# Patient Record
Sex: FEMALE | Race: WHITE | NOT HISPANIC OR LATINO | Employment: FULL TIME | URBAN - METROPOLITAN AREA
[De-identification: names, ages, dates, MRNs, and addresses within clinical notes are randomized per-mention and may not be internally consistent; named-entity substitution may affect disease eponyms.]

---

## 2017-04-05 ENCOUNTER — HOSPITAL ENCOUNTER (EMERGENCY)
Facility: HOSPITAL | Age: 37
Discharge: HOME | End: 2017-04-05
Attending: EMERGENCY MEDICINE
Payer: MEDICAID

## 2017-04-05 VITALS
HEIGHT: 70 IN | WEIGHT: 230 LBS | OXYGEN SATURATION: 98 % | DIASTOLIC BLOOD PRESSURE: 72 MMHG | SYSTOLIC BLOOD PRESSURE: 140 MMHG | HEART RATE: 72 BPM | TEMPERATURE: 98 F | BODY MASS INDEX: 32.93 KG/M2 | RESPIRATION RATE: 18 BRPM
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
## 2017-04-05 DIAGNOSIS — M25.561 ACUTE PAIN OF RIGHT KNEE: ICD-10-CM

## 2017-04-05 DIAGNOSIS — M79.641 RIGHT HAND PAIN: ICD-10-CM

## 2017-04-05 PROCEDURE — 99283 EMERGENCY DEPT VISIT LOW MDM: CPT

## 2017-04-05 PROCEDURE — 99283 EMERGENCY DEPT VISIT LOW MDM: CPT | Mod: ,,, | Performed by: EMERGENCY MEDICINE

## 2017-04-05 PROCEDURE — 25000003 PHARM REV CODE 250: Performed by: EMERGENCY MEDICINE

## 2017-04-05 RX ORDER — NAPROXEN SODIUM 550 MG/1
550 TABLET ORAL 2 TIMES DAILY WITH MEALS
Qty: 20 TABLET | Refills: 0 | Status: SHIPPED | OUTPATIENT
Start: 2017-04-05 | End: 2017-08-10

## 2017-04-05 RX ORDER — ACETAMINOPHEN AND CODEINE PHOSPHATE 300; 30 MG/1; MG/1
1 TABLET ORAL EVERY 6 HOURS PRN
Qty: 8 TABLET | Refills: 0 | Status: SHIPPED | OUTPATIENT
Start: 2017-04-05 | End: 2017-04-15

## 2017-04-05 RX ORDER — ACETAMINOPHEN AND CODEINE PHOSPHATE 300; 30 MG/1; MG/1
1 TABLET ORAL
Status: COMPLETED | OUTPATIENT
Start: 2017-04-05 | End: 2017-04-05

## 2017-04-05 RX ADMIN — ACETAMINOPHEN AND CODEINE PHOSPHATE 1 TABLET: 300; 30 TABLET ORAL at 05:04

## 2017-04-05 NOTE — ED NOTES
Patient identifiers for Shady Lopez checked and correct.  LOC: Patient is awake, alert, and aware of environment with an appropriate affect. Patient is oriented x 3 and speaking appropriately.  APPEARANCE: Patient resting comfortably and in no acute distress. Patient is clean and well groomed, patient's clothing is properly fastened.  SKIN: The skin is warm and dry. Patient has normal skin turgor and moist mucus membrances. Skin is intact; no bruising or breakdown noted.  MUSKULOSKELETAL: Patient is moving all extremities well, no obvious deformities noted. Pulses intact. Swelling to right hand  RESPIRATORY: Airway is open and patent. Respirations are spontaneous and non-labored with normal effort and rate.  CARDIAC: Patient has a normal rate and rhythm. No peripheral edema noted. Capillary refill < 3 seconds.  ABDOMEN: No distention noted. Bowel sounds active in all 4 quadrants. Soft and non-tender upon palpation.  NEUROLOGICAL: PERRL. Facial expression is symmetrical. Hand grasps are equal bilaterally. Normal sensation in all extremities when touched with finger.  Allergies reported: Review of patient's allergies indicates:  No Known Allergies

## 2017-04-05 NOTE — ED PROVIDER NOTES
Encounter Date: 2017    SCRIBE #1 NOTE: I, Mark Schultz, am scribing for, and in the presence of,  Dr. Lerma. I have scribed the entire note.       History     Chief Complaint   Patient presents with    Fall     Patient reports falling yesterday. She has pain to right hand and right knee. Right hand is swollen     Review of patient's allergies indicates:  No Known Allergies  HPI Comments: Time seen by provider: 4:49 AM    This is a 36 y.o. female with active co-morbidities of HTN and migraines who presents to the ED for evaluation of right hand pain on the ulnar side and right knee pain secondary to falling onto her right side 2 days ago. She denies any head trauma or LOC. Patient endorses swelling of her hand and mild numbness of her digits. She denies any other complaints at this time.     The history is provided by the patient.     Past Medical History:   Diagnosis Date    Bronchitis     Hypertension     Migraine headache     Migraines      Past Surgical History:   Procedure Laterality Date     SECTION      TUBAL LIGATION       Family History   Problem Relation Age of Onset    Asthma Mother     Heart disease Father      Social History   Substance Use Topics    Smoking status: Former Smoker    Smokeless tobacco: None    Alcohol use No     Review of Systems   Constitutional: Negative for fever.   HENT: Negative for sore throat.    Respiratory: Negative for shortness of breath.    Cardiovascular: Negative for chest pain.   Gastrointestinal: Negative for nausea.   Genitourinary: Negative for dysuria.   Musculoskeletal: Negative for back pain and neck pain.        Right hand and knee pain.   Skin: Negative for rash.   Neurological: Negative for weakness.   Hematological: Does not bruise/bleed easily.       Physical Exam   Initial Vitals   BP Pulse Resp Temp SpO2   17 0001 17 0001 17 0001 17 0001 17 0001   139/78 70 18 98.3 °F (36.8 °C) 98 %     Physical  Exam    Nursing note and vitals reviewed.  Constitutional: She appears well-developed and well-nourished.   Neck:   No neck or back pain.   Musculoskeletal:   Mildly limited flexion secondary to pain and swelling of the right hand. Swelling in the dorsal aspect at the ulnar side at the 3rd-5th digits. Pain with supination. Negative Lachman's test. Joint laxity with various stresses. Point tenderness over the tibial tuberosity. No swelling or effusion. Right elbow and shoulder with normal ROM.    Neurological:   Mild sensory deficit to distal fingertips of the right hand.         ED Course   Procedures  Labs Reviewed - No data to display          Medical Decision Making:   History:   Old Medical Records: I decided to obtain old medical records.  Initial Assessment:   Patient presents status post fall with a right hand and knee injury. XR negative for fractures. Overall impression is hand contusion and knee contusion. Doubt sprain of the knee based on my exam. Recommend rest, ice, compression, and elevation.   Clinical Tests:   Radiological Study: Ordered and Reviewed            Scribe Attestation:   Scribe #1: I performed the above scribed service and the documentation accurately describes the services I performed. I attest to the accuracy of the note.    Attending Attestation:           Physician Attestation for Scribe:  Physician Attestation Statement for Scribe #1: I, Dr. Lerma, reviewed documentation, as scribed by Mark Schultz in my presence, and it is both accurate and complete.                 ED Course     Clinical Impression:   The primary encounter diagnosis was Fall, initial encounter. Diagnoses of Fall, Right hand pain, and Acute pain of right knee were also pertinent to this visit.    Disposition:   Disposition: Discharged  Condition: Stable       Carlton Lerma MD  04/16/17 1941

## 2017-04-05 NOTE — DISCHARGE INSTRUCTIONS
RICE     Rest an injury, elevate it, and use ice and compression as directed.   RICE stands for rest, ice, compression, and elevation. These can limit pain and swelling after an injury. RICE may be recommended to help treat fractures, sprains, strains, and bruises or bumps.   Home care  The following explain the details of RICE:  · Rest. Limit the use of the injured body part. This helps prevent further damage to the body part and gives it time to heal. In some cases, you may need a sling, brace, splint, or cast to help keep the body part still until it has healed.  · Ice. Applying ice right after an injury helps relieve pain and swelling. Wrap a bag of ice in a thin towel. Then, place it over the injured area. Do this for 10 to 15 minutes every 3 to 4 hours. Continue for the next 1 to 3 days or until your symptoms improve. Never put ice directly on your skin or ice an area longer than 15 minutes at a time.  · Compression. Putting pressure on an injury helps reduce swelling and provides support. Wrap the injured area firmly with an elastic bandage/wrap. Make sure not to wrap the bandage too tightly or you will cut off blood flow to the injured area. If your bandage loosens, rewrap it.  · Elevation. Keeping an injury raised above the level of your heart reduces swelling, pain, and throbbing. For instance, if you have a broken leg, it may help to rest your leg on several pillows when sitting or lying down. Try to keep the injured area elevated for at least 2 to 3 hours per day.  Follow-up care  Follow up with your healthcare provider, or as advised.  When to seek medical advice  Call your healthcare provider right away if any of these occur:  · Fever of 100.4°F (38°C) or higher, or as directed by your healthcare provider  · Increased pain or swelling in the injured body part  · Injured body part becomes cold, blue, numb, or tingly  · Signs of infection. These include warmth in the skin, redness, drainage, or bad  smell coming from the injured body part.  Date Last Reviewed: 1/18/2016  © 2590-7439 [employer]. [address] [place] [address], [place] [address]. All rights reserved. This information is not intended as a substitute for professional medical care. Always follow your healthcare professional's instructions.

## 2017-04-05 NOTE — ED AVS SNAPSHOT
[facility]  [address]  [facility] [address]               Shady Lopez   2017  3:14 AM   ED    Description:  Female : 1980   Department:  [facility]           Your Care was Coordinated By:     Provider Role From To    Yas Jimenez MD Resident 17      Reason for Visit     Fall           Diagnoses this Visit        Comments    Fall, initial encounter    -  Primary     Fall         Right hand pain         Acute pain of right knee           ED Disposition     None           To Do List           Follow-up Information     Follow up with Mc Minaya NP. Call in 3 days.    Specialty:  Family Medicine    Why:  As needed    Contact information:    [address] [place] [address]  [phone]         These Medications        Disp Refills Start End    naproxen sodium (ANAPROX) 550 MG tablet 20 tablet 0 2017     Take 1 tablet (550 mg total) by mouth 2 (two) times daily with meals. - Oral    Pharmacy: [facility] [address] [place], [place] [address] [address] EX[address] AT [facility] Ph #: [phone]       acetaminophen-codeine 300-30mg (TYLENOL #3) 300-30 mg Tab 8 tablet 0 2017 4/15/2017    Take 1 tablet by mouth every 6 (six) hours as needed (severe pain). - Oral    Pharmacy: [facility] [address] [place], [place] [address] [address] AT [facility] Ph #: [phone]         [facility]     [facility] Nurse Care Line -  Assistance  Unless otherwise directed by your provider, please contact [facility], our nurse care line that is available for  assistance.     Registered nurses in the [facility] provide: appointment scheduling, clinical advisement, health education, and other advisory services.  Call: [phone] (toll free)               Medications           Message regarding Medications     Verify the changes and/or additions to  "your medication regime listed below are the same as discussed with your clinician today.  If any of these changes or additions are incorrect, please notify your healthcare provider.        START taking these NEW medications        Refills    acetaminophen-codeine 300-30mg (TYLENOL #3) 300-30 mg Tab 0    Sig: Take 1 tablet by mouth every 6 (six) hours as needed (severe pain).    Class: Print    Route: Oral           Verify that the below list of medications is an accurate representation of the medications you are currently taking.  If none reported, the list may be blank. If incorrect, please contact your healthcare provider. Carry this list with you in case of emergency.           Current Medications     acetaminophen-codeine 300-30mg (TYLENOL #3) 300-30 mg Tab Take 1 tablet by mouth every 6 (six) hours as needed (severe pain).    butalbital-acetaminophen-caffeine -40 mg (FIORICET, ESGIC) -40 mg per tablet Take 1 tablet by mouth every 4 (four) hours as needed for Pain.    labetalol (NORMODYNE) 200 MG tablet Take 1 tablet (200 mg total) by mouth every 12 (twelve) hours.    meperidine (DEMEROL) 50 mg tablet Take 1 tablet (50 mg total) by mouth every 6 (six) hours as needed.    naproxen (NAPROSYN) 375 MG tablet Take 1 tablet (375 mg total) by mouth 2 (two) times daily as needed (take as needed for pain with meals).    naproxen sodium (ANAPROX) 550 MG tablet Take 1 tablet (550 mg total) by mouth 2 (two) times daily with meals.           Clinical Reference Information           Your Vitals Were     BP Pulse Temp Resp Height Weight    139/78 (BP Location: Right arm, Patient Position: Sitting) 70 98.3 °F (36.8 °C) (Oral) 18 5' 10" (1.778 m) 104.3 kg (230 lb)    SpO2 BMI             98% 33 kg/m2         Allergies as of 4/5/2017     No Known Allergies      Immunizations Administered on Date of Encounter - 4/5/2017     None      ED Micro, Lab, POCT     None      ED Imaging Orders     Start Ordered       Status " Ordering Provider    04/05/17 0234 04/05/17 0233  X-Ray Hand 3 view Right  1 time imaging      Final result     04/05/17 0234 04/05/17 0233  X-Ray Wrist Complete Right  1 time imaging      Final result     04/05/17 0234 04/05/17 0233  X-Ray Knee 3 View Right  1 time imaging      Final result         Discharge Instructions         RICE     Rest an injury, elevate it, and use ice and compression as directed.   RICE stands for rest, ice, compression, and elevation. These can limit pain and swelling after an injury. RICE may be recommended to help treat fractures, sprains, strains, and bruises or bumps.   Home care  The following explain the details of RICE:  · Rest. Limit the use of the injured body part. This helps prevent further damage to the body part and gives it time to heal. In some cases, you may need a sling, brace, splint, or cast to help keep the body part still until it has healed.  · Ice. Applying ice right after an injury helps relieve pain and swelling. Wrap a bag of ice in a thin towel. Then, place it over the injured area. Do this for 10 to 15 minutes every 3 to 4 hours. Continue for the next 1 to 3 days or until your symptoms improve. Never put ice directly on your skin or ice an area longer than 15 minutes at a time.  · Compression. Putting pressure on an injury helps reduce swelling and provides support. Wrap the injured area firmly with an elastic bandage/wrap. Make sure not to wrap the bandage too tightly or you will cut off blood flow to the injured area. If your bandage loosens, rewrap it.  · Elevation. Keeping an injury raised above the level of your heart reduces swelling, pain, and throbbing. For instance, if you have a broken leg, it may help to rest your leg on several pillows when sitting or lying down. Try to keep the injured area elevated for at least 2 to 3 hours per day.  Follow-up care  Follow up with your healthcare provider, or as advised.  When to seek medical advice  Call your  healthcare provider right away if any of these occur:  · Fever of 100.4°F (38°C) or higher, or as directed by your healthcare provider  · Increased pain or swelling in the injured body part  · Injured body part becomes cold, blue, numb, or tingly  · Signs of infection. These include warmth in the skin, redness, drainage, or bad smell coming from the injured body part.  Date Last Reviewed: 1/18/2016  © 1626-5560 [employer]. [address] [place] [address]. All rights reserved. This information is not intended as a substitute for professional medical care. Always follow your healthcare professional's instructions.          [facility] Sign-Up     Activating your [facility] account is as easy as 1-2-3!     1) Visit [email or web address], select Sign Up Now, enter this activation code and your date of birth, then select Next.  [email or web address]  Expires: 5/20/2017  4:56 AM      2) Create a username and password to use when you visit [facility] in the future and select a security question in case you lose your password and select Next.    3) Enter your e-mail address and click Sign Up!    Additional Information  If you have questions, please e-mail [email or web address] or call [phone] to talk to our [facility] staff. Remember, [facility] is NOT to be used for urgent needs. For medical emergencies, dial 911.         Smoking Cessation     If you would like to quit smoking:   You may be eligible for free services if you are a [place] resident and started smoking cigarettes before September 1, 1988.  Call the Smoking Cessation Trust (SCT) toll free at (654) 076-5526 or (010) 784-4536.   Call 6-800-QUIT-NOW if you do not meet the above criteria.   Contact us via email: [email or web address]   View our website for more information: [email or web address]         [facility] complies with applicable Federal civil rights laws and does not discriminate on the basis of race, color,  national origin, age, disability, or sex.        Language Assistance Services     ATTENTION: Language assistance services are available, free of charge. Please call [phone].      ATENCIÓN: Si habla sienna, tiene a lopez disposición servicios gratuitos de asistencia lingüística. Llame al [phone].     CHÚ Ý: N?u b?n nói Ti?ng Vi?t, có các d?ch v? h? tr? ngôn ng? mi?n phí dành cho b?n. G?i s? [phone].

## 2017-08-10 ENCOUNTER — ANESTHESIA (OUTPATIENT)
Dept: SURGERY | Facility: HOSPITAL | Age: 37
End: 2017-08-10
Payer: MEDICAID

## 2017-08-10 ENCOUNTER — HOSPITAL ENCOUNTER (OUTPATIENT)
Facility: HOSPITAL | Age: 37
Discharge: HOME | End: 2017-08-11
Attending: EMERGENCY MEDICINE | Admitting: EMERGENCY MEDICINE
Payer: MEDICAID

## 2017-08-10 ENCOUNTER — ANESTHESIA EVENT (OUTPATIENT)
Dept: SURGERY | Facility: HOSPITAL | Age: 37
End: 2017-08-10
Payer: MEDICAID

## 2017-08-10 DIAGNOSIS — K81.0 CHOLECYSTITIS, ACUTE: Primary | ICD-10-CM

## 2017-08-10 DIAGNOSIS — K80.00 CALCULUS OF GALLBLADDER WITH ACUTE CHOLECYSTITIS WITHOUT OBSTRUCTION: ICD-10-CM

## 2017-08-10 LAB
ALBUMIN SERPL BCP-MCNC: 3.9 G/DL
ALP SERPL-CCNC: 113 U/L
ALT SERPL W/O P-5'-P-CCNC: 13 U/L
ANION GAP SERPL CALC-SCNC: 9 MMOL/L
AST SERPL-CCNC: 12 U/L
B-HCG UR QL: NEGATIVE
BASOPHILS # BLD AUTO: 0.02 K/UL
BASOPHILS NFR BLD: 0.2 %
BILIRUB SERPL-MCNC: 0.6 MG/DL
BUN SERPL-MCNC: 15 MG/DL
CALCIUM SERPL-MCNC: 9.9 MG/DL
CHLORIDE SERPL-SCNC: 100 MMOL/L
CO2 SERPL-SCNC: 27 MMOL/L
CREAT SERPL-MCNC: 1 MG/DL
CTP QC/QA: YES
DIFFERENTIAL METHOD: ABNORMAL
EOSINOPHIL # BLD AUTO: 0.2 K/UL
EOSINOPHIL NFR BLD: 2.2 %
ERYTHROCYTE [DISTWIDTH] IN BLOOD BY AUTOMATED COUNT: 13.2 %
EST. GFR  (AFRICAN AMERICAN): >60 ML/MIN/1.73 M^2
EST. GFR  (NON AFRICAN AMERICAN): >60 ML/MIN/1.73 M^2
GLUCOSE SERPL-MCNC: 110 MG/DL
HCT VFR BLD AUTO: 39.2 %
HGB BLD-MCNC: 13.1 G/DL
LIPASE SERPL-CCNC: 22 U/L
LYMPHOCYTES # BLD AUTO: 1.4 K/UL
LYMPHOCYTES NFR BLD: 13.7 %
MCH RBC QN AUTO: 29 PG
MCHC RBC AUTO-ENTMCNC: 33.4 G/DL
MCV RBC AUTO: 87 FL
MONOCYTES # BLD AUTO: 0.5 K/UL
MONOCYTES NFR BLD: 4.6 %
NEUTROPHILS # BLD AUTO: 8.4 K/UL
NEUTROPHILS NFR BLD: 79.3 %
PLATELET # BLD AUTO: 319 K/UL
PMV BLD AUTO: 10.8 FL
POTASSIUM SERPL-SCNC: 4.5 MMOL/L
PROT SERPL-MCNC: 7.4 G/DL
RBC # BLD AUTO: 4.51 M/UL
SODIUM SERPL-SCNC: 136 MMOL/L
WBC # BLD AUTO: 10.52 K/UL

## 2017-08-10 PROCEDURE — 25000003 PHARM REV CODE 250: Performed by: NURSE PRACTITIONER

## 2017-08-10 PROCEDURE — 25000003 PHARM REV CODE 250: Performed by: NURSE ANESTHETIST, CERTIFIED REGISTERED

## 2017-08-10 PROCEDURE — G0378 HOSPITAL OBSERVATION PER HR: HCPCS

## 2017-08-10 PROCEDURE — 96365 THER/PROPH/DIAG IV INF INIT: CPT

## 2017-08-10 PROCEDURE — 99285 EMERGENCY DEPT VISIT HI MDM: CPT | Mod: 25

## 2017-08-10 PROCEDURE — 88304 TISSUE EXAM BY PATHOLOGIST: CPT | Mod: 26,,, | Performed by: PATHOLOGY

## 2017-08-10 PROCEDURE — 25000003 PHARM REV CODE 250: Performed by: SURGERY

## 2017-08-10 PROCEDURE — 36000708 HC OR TIME LEV III 1ST 15 MIN: Performed by: SURGERY

## 2017-08-10 PROCEDURE — 96375 TX/PRO/DX INJ NEW DRUG ADDON: CPT

## 2017-08-10 PROCEDURE — 63600175 PHARM REV CODE 636 W HCPCS: Performed by: NURSE PRACTITIONER

## 2017-08-10 PROCEDURE — D9220A PRA ANESTHESIA: Mod: ANES,,, | Performed by: ANESTHESIOLOGY

## 2017-08-10 PROCEDURE — 96361 HYDRATE IV INFUSION ADD-ON: CPT

## 2017-08-10 PROCEDURE — 88304 TISSUE EXAM BY PATHOLOGIST: CPT | Performed by: PATHOLOGY

## 2017-08-10 PROCEDURE — 71000033 HC RECOVERY, INTIAL HOUR: Performed by: SURGERY

## 2017-08-10 PROCEDURE — 25000003 PHARM REV CODE 250: Performed by: STUDENT IN AN ORGANIZED HEALTH CARE EDUCATION/TRAINING PROGRAM

## 2017-08-10 PROCEDURE — 37000008 HC ANESTHESIA 1ST 15 MINUTES: Performed by: SURGERY

## 2017-08-10 PROCEDURE — 37000009 HC ANESTHESIA EA ADD 15 MINS: Performed by: SURGERY

## 2017-08-10 PROCEDURE — 71000039 HC RECOVERY, EACH ADD'L HOUR: Performed by: SURGERY

## 2017-08-10 PROCEDURE — 85025 COMPLETE CBC W/AUTO DIFF WBC: CPT

## 2017-08-10 PROCEDURE — 27201423 OPTIME MED/SURG SUP & DEVICES STERILE SUPPLY: Performed by: SURGERY

## 2017-08-10 PROCEDURE — 80053 COMPREHEN METABOLIC PANEL: CPT

## 2017-08-10 PROCEDURE — 63600175 PHARM REV CODE 636 W HCPCS: Performed by: NURSE ANESTHETIST, CERTIFIED REGISTERED

## 2017-08-10 PROCEDURE — D9220A PRA ANESTHESIA: Mod: CRNA,,, | Performed by: NURSE ANESTHETIST, CERTIFIED REGISTERED

## 2017-08-10 PROCEDURE — 83690 ASSAY OF LIPASE: CPT

## 2017-08-10 PROCEDURE — 81025 URINE PREGNANCY TEST: CPT | Performed by: NURSE PRACTITIONER

## 2017-08-10 PROCEDURE — S0028 INJECTION, FAMOTIDINE, 20 MG: HCPCS | Performed by: STUDENT IN AN ORGANIZED HEALTH CARE EDUCATION/TRAINING PROGRAM

## 2017-08-10 PROCEDURE — 63600175 PHARM REV CODE 636 W HCPCS: Performed by: ANESTHESIOLOGY

## 2017-08-10 PROCEDURE — 36000709 HC OR TIME LEV III EA ADD 15 MIN: Performed by: SURGERY

## 2017-08-10 RX ORDER — ENOXAPARIN SODIUM 100 MG/ML
40 INJECTION SUBCUTANEOUS EVERY 24 HOURS
Status: DISCONTINUED | OUTPATIENT
Start: 2017-08-11 | End: 2017-08-11 | Stop reason: HOSPADM

## 2017-08-10 RX ORDER — NEOSTIGMINE METHYLSULFATE 1 MG/ML
INJECTION, SOLUTION INTRAVENOUS
Status: DISCONTINUED | OUTPATIENT
Start: 2017-08-10 | End: 2017-08-10

## 2017-08-10 RX ORDER — ROCURONIUM BROMIDE 10 MG/ML
INJECTION, SOLUTION INTRAVENOUS
Status: DISCONTINUED | OUTPATIENT
Start: 2017-08-10 | End: 2017-08-10

## 2017-08-10 RX ORDER — MIDAZOLAM HYDROCHLORIDE 1 MG/ML
INJECTION, SOLUTION INTRAMUSCULAR; INTRAVENOUS
Status: DISCONTINUED | OUTPATIENT
Start: 2017-08-10 | End: 2017-08-10

## 2017-08-10 RX ORDER — SODIUM CHLORIDE 0.9 % (FLUSH) 0.9 %
3 SYRINGE (ML) INJECTION
Status: DISCONTINUED | OUTPATIENT
Start: 2017-08-10 | End: 2017-08-11 | Stop reason: HOSPADM

## 2017-08-10 RX ORDER — METOCLOPRAMIDE HYDROCHLORIDE 5 MG/ML
10 INJECTION INTRAMUSCULAR; INTRAVENOUS EVERY 10 MIN PRN
Status: DISCONTINUED | OUTPATIENT
Start: 2017-08-10 | End: 2017-08-11 | Stop reason: HOSPADM

## 2017-08-10 RX ORDER — LIDOCAINE HYDROCHLORIDE AND EPINEPHRINE 10; 10 MG/ML; UG/ML
INJECTION, SOLUTION INFILTRATION; PERINEURAL
Status: DISCONTINUED | OUTPATIENT
Start: 2017-08-10 | End: 2017-08-10 | Stop reason: HOSPADM

## 2017-08-10 RX ORDER — OXYCODONE AND ACETAMINOPHEN 5; 325 MG/1; MG/1
1 TABLET ORAL EVERY 4 HOURS PRN
Status: DISCONTINUED | OUTPATIENT
Start: 2017-08-10 | End: 2017-08-11 | Stop reason: HOSPADM

## 2017-08-10 RX ORDER — ACETAMINOPHEN 10 MG/ML
INJECTION, SOLUTION INTRAVENOUS
Status: DISCONTINUED | OUTPATIENT
Start: 2017-08-10 | End: 2017-08-10

## 2017-08-10 RX ORDER — ONDANSETRON 2 MG/ML
4 INJECTION INTRAMUSCULAR; INTRAVENOUS EVERY 12 HOURS PRN
Status: DISCONTINUED | OUTPATIENT
Start: 2017-08-10 | End: 2017-08-10

## 2017-08-10 RX ORDER — GLYCOPYRROLATE 0.2 MG/ML
INJECTION INTRAMUSCULAR; INTRAVENOUS
Status: DISCONTINUED | OUTPATIENT
Start: 2017-08-10 | End: 2017-08-10

## 2017-08-10 RX ORDER — ONDANSETRON 2 MG/ML
INJECTION INTRAMUSCULAR; INTRAVENOUS
Status: DISCONTINUED | OUTPATIENT
Start: 2017-08-10 | End: 2017-08-10

## 2017-08-10 RX ORDER — LABETALOL 100 MG/1
200 TABLET, FILM COATED ORAL EVERY 12 HOURS
Status: DISCONTINUED | OUTPATIENT
Start: 2017-08-10 | End: 2017-08-11 | Stop reason: HOSPADM

## 2017-08-10 RX ORDER — SODIUM CHLORIDE 9 MG/ML
INJECTION, SOLUTION INTRAVENOUS CONTINUOUS
Status: DISCONTINUED | OUTPATIENT
Start: 2017-08-10 | End: 2017-08-11 | Stop reason: HOSPADM

## 2017-08-10 RX ORDER — ONDANSETRON 2 MG/ML
4 INJECTION INTRAMUSCULAR; INTRAVENOUS EVERY 6 HOURS PRN
Status: DISCONTINUED | OUTPATIENT
Start: 2017-08-10 | End: 2017-08-11 | Stop reason: HOSPADM

## 2017-08-10 RX ORDER — MORPHINE SULFATE 10 MG/ML
2 INJECTION INTRAMUSCULAR; INTRAVENOUS; SUBCUTANEOUS EVERY 4 HOURS PRN
Status: DISCONTINUED | OUTPATIENT
Start: 2017-08-10 | End: 2017-08-10

## 2017-08-10 RX ORDER — MEPERIDINE HYDROCHLORIDE 50 MG/ML
12.5 INJECTION INTRAMUSCULAR; INTRAVENOUS; SUBCUTANEOUS ONCE AS NEEDED
Status: ACTIVE | OUTPATIENT
Start: 2017-08-10 | End: 2017-08-10

## 2017-08-10 RX ORDER — FENTANYL CITRATE 50 UG/ML
INJECTION, SOLUTION INTRAMUSCULAR; INTRAVENOUS
Status: DISCONTINUED | OUTPATIENT
Start: 2017-08-10 | End: 2017-08-10

## 2017-08-10 RX ORDER — HYDROMORPHONE HYDROCHLORIDE 2 MG/ML
0.2 INJECTION, SOLUTION INTRAMUSCULAR; INTRAVENOUS; SUBCUTANEOUS EVERY 5 MIN PRN
Status: DISCONTINUED | OUTPATIENT
Start: 2017-08-10 | End: 2017-08-11 | Stop reason: HOSPADM

## 2017-08-10 RX ORDER — HYDROMORPHONE HYDROCHLORIDE 2 MG/ML
1 INJECTION, SOLUTION INTRAMUSCULAR; INTRAVENOUS; SUBCUTANEOUS EVERY 4 HOURS PRN
Status: DISCONTINUED | OUTPATIENT
Start: 2017-08-10 | End: 2017-08-10

## 2017-08-10 RX ORDER — BUPIVACAINE HYDROCHLORIDE 5 MG/ML
INJECTION, SOLUTION PERINEURAL
Status: DISCONTINUED | OUTPATIENT
Start: 2017-08-10 | End: 2017-08-10 | Stop reason: HOSPADM

## 2017-08-10 RX ORDER — ENOXAPARIN SODIUM 100 MG/ML
40 INJECTION SUBCUTANEOUS EVERY 24 HOURS
Status: DISCONTINUED | OUTPATIENT
Start: 2017-08-10 | End: 2017-08-10

## 2017-08-10 RX ORDER — LIDOCAINE HCL/PF 100 MG/5ML
SYRINGE (ML) INTRAVENOUS
Status: DISCONTINUED | OUTPATIENT
Start: 2017-08-10 | End: 2017-08-10

## 2017-08-10 RX ORDER — HYDRALAZINE HYDROCHLORIDE 20 MG/ML
INJECTION INTRAMUSCULAR; INTRAVENOUS
Status: DISCONTINUED | OUTPATIENT
Start: 2017-08-10 | End: 2017-08-10

## 2017-08-10 RX ORDER — PROPOFOL 10 MG/ML
VIAL (ML) INTRAVENOUS
Status: DISCONTINUED | OUTPATIENT
Start: 2017-08-10 | End: 2017-08-10

## 2017-08-10 RX ORDER — KETOROLAC TROMETHAMINE 30 MG/ML
INJECTION, SOLUTION INTRAMUSCULAR; INTRAVENOUS
Status: DISCONTINUED | OUTPATIENT
Start: 2017-08-10 | End: 2017-08-10

## 2017-08-10 RX ORDER — LABETALOL HYDROCHLORIDE 5 MG/ML
INJECTION, SOLUTION INTRAVENOUS
Status: DISCONTINUED | OUTPATIENT
Start: 2017-08-10 | End: 2017-08-10

## 2017-08-10 RX ORDER — ACETAMINOPHEN 650 MG/1
650 SUPPOSITORY RECTAL EVERY 8 HOURS PRN
Status: DISCONTINUED | OUTPATIENT
Start: 2017-08-10 | End: 2017-08-10

## 2017-08-10 RX ORDER — ONDANSETRON 2 MG/ML
8 INJECTION INTRAMUSCULAR; INTRAVENOUS
Status: COMPLETED | OUTPATIENT
Start: 2017-08-10 | End: 2017-08-10

## 2017-08-10 RX ORDER — OXYCODONE AND ACETAMINOPHEN 5; 325 MG/1; MG/1
1 TABLET ORAL
Status: DISCONTINUED | OUTPATIENT
Start: 2017-08-10 | End: 2017-08-11 | Stop reason: HOSPADM

## 2017-08-10 RX ORDER — SODIUM CHLORIDE, SODIUM LACTATE, POTASSIUM CHLORIDE, CALCIUM CHLORIDE 600; 310; 30; 20 MG/100ML; MG/100ML; MG/100ML; MG/100ML
INJECTION, SOLUTION INTRAVENOUS CONTINUOUS PRN
Status: DISCONTINUED | OUTPATIENT
Start: 2017-08-10 | End: 2017-08-10

## 2017-08-10 RX ORDER — MORPHINE SULFATE 10 MG/ML
2 INJECTION INTRAMUSCULAR; INTRAVENOUS; SUBCUTANEOUS
Status: DISCONTINUED | OUTPATIENT
Start: 2017-08-10 | End: 2017-08-11 | Stop reason: HOSPADM

## 2017-08-10 RX ORDER — FAMOTIDINE 10 MG/ML
20 INJECTION INTRAVENOUS 2 TIMES DAILY
Status: DISCONTINUED | OUTPATIENT
Start: 2017-08-10 | End: 2017-08-11 | Stop reason: HOSPADM

## 2017-08-10 RX ADMIN — LABETALOL HYDROCHLORIDE 20 MG: 5 INJECTION, SOLUTION INTRAVENOUS at 05:08

## 2017-08-10 RX ADMIN — LABETALOL HYDROCHLORIDE 200 MG: 100 TABLET, FILM COATED ORAL at 09:08

## 2017-08-10 RX ADMIN — SODIUM CHLORIDE, SODIUM LACTATE, POTASSIUM CHLORIDE, AND CALCIUM CHLORIDE: .6; .31; .03; .02 INJECTION, SOLUTION INTRAVENOUS at 05:08

## 2017-08-10 RX ADMIN — PIPERACILLIN SODIUM AND TAZOBACTAM SODIUM 4.5 G: 4; .5 INJECTION, POWDER, FOR SOLUTION INTRAVENOUS at 01:08

## 2017-08-10 RX ADMIN — ONDANSETRON 4 MG: 2 INJECTION, SOLUTION INTRAMUSCULAR; INTRAVENOUS at 07:08

## 2017-08-10 RX ADMIN — PROPOFOL 200 MG: 10 INJECTION, EMULSION INTRAVENOUS at 06:08

## 2017-08-10 RX ADMIN — ROCURONIUM BROMIDE 5 MG: 10 INJECTION, SOLUTION INTRAVENOUS at 06:08

## 2017-08-10 RX ADMIN — FENTANYL CITRATE 100 MCG: 50 INJECTION INTRAMUSCULAR; INTRAVENOUS at 05:08

## 2017-08-10 RX ADMIN — ROCURONIUM BROMIDE 50 MG: 10 INJECTION, SOLUTION INTRAVENOUS at 05:08

## 2017-08-10 RX ADMIN — HYDROMORPHONE HYDROCHLORIDE 0.2 MG: 2 INJECTION INTRAMUSCULAR; INTRAVENOUS; SUBCUTANEOUS at 07:08

## 2017-08-10 RX ADMIN — ONDANSETRON 8 MG: 2 INJECTION INTRAMUSCULAR; INTRAVENOUS at 11:08

## 2017-08-10 RX ADMIN — LIDOCAINE HYDROCHLORIDE 100 MG: 20 INJECTION, SOLUTION INTRAVENOUS at 05:08

## 2017-08-10 RX ADMIN — GLYCOPYRROLATE 0.6 MG: 0.2 INJECTION, SOLUTION INTRAMUSCULAR; INTRAVENOUS at 07:08

## 2017-08-10 RX ADMIN — FENTANYL CITRATE 50 MCG: 50 INJECTION INTRAMUSCULAR; INTRAVENOUS at 07:08

## 2017-08-10 RX ADMIN — PROPOFOL 200 MG: 10 INJECTION, EMULSION INTRAVENOUS at 05:08

## 2017-08-10 RX ADMIN — MIDAZOLAM HYDROCHLORIDE 2 MG: 1 INJECTION, SOLUTION INTRAMUSCULAR; INTRAVENOUS at 05:08

## 2017-08-10 RX ADMIN — ROCURONIUM BROMIDE 10 MG: 10 INJECTION, SOLUTION INTRAVENOUS at 06:08

## 2017-08-10 RX ADMIN — HYDROMORPHONE HYDROCHLORIDE 1 MG: 2 INJECTION INTRAMUSCULAR; INTRAVENOUS; SUBCUTANEOUS at 03:08

## 2017-08-10 RX ADMIN — SODIUM CHLORIDE, SODIUM LACTATE, POTASSIUM CHLORIDE, AND CALCIUM CHLORIDE: .6; .31; .03; .02 INJECTION, SOLUTION INTRAVENOUS at 07:08

## 2017-08-10 RX ADMIN — NEOSTIGMINE METHYLSULFATE 5 MG: 1 INJECTION INTRAVENOUS at 07:08

## 2017-08-10 RX ADMIN — HYDRALAZINE HYDROCHLORIDE 5 MG: 20 INJECTION INTRAMUSCULAR; INTRAVENOUS at 06:08

## 2017-08-10 RX ADMIN — ACETAMINOPHEN 1000 MG: 10 INJECTION, SOLUTION INTRAVENOUS at 05:08

## 2017-08-10 RX ADMIN — KETOROLAC TROMETHAMINE 30 MG: 30 INJECTION, SOLUTION INTRAMUSCULAR; INTRAVENOUS at 07:08

## 2017-08-10 RX ADMIN — PIPERACILLIN, TAZOBACTAM 4.5 G: 4; .5 INJECTION, POWDER, LYOPHILIZED, FOR SOLUTION INTRAVENOUS at 10:08

## 2017-08-10 RX ADMIN — SODIUM CHLORIDE 1000 ML: 0.9 INJECTION, SOLUTION INTRAVENOUS at 10:08

## 2017-08-10 RX ADMIN — FAMOTIDINE 20 MG: 10 INJECTION INTRAVENOUS at 10:08

## 2017-08-10 RX ADMIN — HYDROMORPHONE HYDROCHLORIDE 0.2 MG: 2 INJECTION INTRAMUSCULAR; INTRAVENOUS; SUBCUTANEOUS at 10:08

## 2017-08-10 RX ADMIN — LABETALOL HYDROCHLORIDE 10 MG: 5 INJECTION, SOLUTION INTRAVENOUS at 06:08

## 2017-08-10 NOTE — ANESTHESIA PREPROCEDURE EVALUATION
08/10/2017  Shady Lopez is a 37 y.o., female.    Anesthesia Evaluation    I have reviewed the Patient Summary Reports.     I have reviewed the Medications.     Review of Systems  Anesthesia Hx:  No problems with previous Anesthesia    Social:  Former Smoker, No Alcohol Use    Cardiovascular:   Hypertension    Neurological:   Headaches        Physical Exam  General:  Well nourished, Obesity    Airway/Jaw/Neck:  Airway Findings: Mouth Opening: Normal Tongue: Normal  General Airway Assessment: Adult  Mallampati: III  Improves to II with phonation.  TM Distance: Normal, at least 6 cm  Jaw/Neck Findings:  Neck ROM: Normal ROM      Dental:  Dental Findings: In tact   Chest/Lungs:  Chest/Lungs Findings: Clear to auscultation, Normal Respiratory Rate     Heart/Vascular:  Heart Findings: Rate: Normal        Mental Status:  Mental Status Findings:  Cooperative, Alert and Oriented         Anesthesia Plan  Type of Anesthesia, risks & benefits discussed:  Anesthesia Type:  general  Patient's Preference:   Intra-op Monitoring Plan: standard ASA monitors  Intra-op Monitoring Plan Comments:   Post Op Pain Control Plan: multimodal analgesia, IV/PO Opioids PRN and per primary service following discharge from PACU  Post Op Pain Control Plan Comments:   Induction:   IV  Beta Blocker:  Patient is not currently on a Beta-Blocker (No further documentation required).       Informed Consent: Patient understands risks and agrees with Anesthesia plan.  Questions answered. Anesthesia consent signed with patient.  ASA Score: 2     Day of Surgery Review of History & Physical:    H&P update referred to the surgeon.         Ready For Surgery From Anesthesia Perspective.

## 2017-08-10 NOTE — ED PROVIDER NOTES
"Encounter Date: 8/10/2017    SCRIBE #1 NOTE: I, Treealexandro Pena, am scribing for, and in the presence of,  Reid Dewey NP. I have scribed the following portions of the note - Other sections scribed: HPI/ROS.       History     Chief Complaint   Patient presents with    Abdominal Pain     States she woke up this morning with abd pain, NV. Vomited one time    Nausea    Vomiting     CC: Abdominal Pain    HPI: This 37 y.o. Female with a medical history of bronchitis, HTN, and migraine headache presents to the ED c/o acute, severe (10/10) epigastric abdominal pain with associated nausea and vomiting that began around 5:00 AM today. Patient reports that she woke up out of her sleep this morning nothing that she vomited 1x. Pain is exacerbated with palpation and laying flat. She notes experiencing symptoms "the other day," but they eventually resolved on their own. No attempted tx. No alleviating factors. Patient denies diarrhea, back pain, chest pain, fever, chills, urinary frequency, and dysuria.       The history is provided by the patient. No  was used.     Review of patient's allergies indicates:  No Known Allergies  Past Medical History:   Diagnosis Date    Bronchitis     Hypertension     Migraine headache     Migraines      Past Surgical History:   Procedure Laterality Date     SECTION      TUBAL LIGATION       Family History   Problem Relation Age of Onset    Asthma Mother     Heart disease Father      Social History   Substance Use Topics    Smoking status: Former Smoker    Smokeless tobacco: Never Used    Alcohol use No     Review of Systems   Constitutional: Negative for chills and fever.   HENT: Negative for congestion, ear pain, rhinorrhea and sore throat.    Eyes: Negative for pain and visual disturbance.   Respiratory: Negative for cough and shortness of breath.    Cardiovascular: Negative for chest pain.   Gastrointestinal: Positive for abdominal pain, nausea and " vomiting. Negative for diarrhea.   Genitourinary: Negative for dysuria.   Musculoskeletal: Negative for back pain and neck pain.   Skin: Negative for rash.   Neurological: Negative for weakness, numbness and headaches.       Physical Exam     Initial Vitals [08/10/17 0924]   BP Pulse Resp Temp SpO2   (!) 180/94 60 18 98.1 °F (36.7 °C) 100 %      MAP       122.67         Physical Exam    Nursing note and vitals reviewed.  Constitutional: She appears well-developed and well-nourished. She is not diaphoretic. No distress.   HENT:   Head: Normocephalic and atraumatic.   Right Ear: External ear normal.   Left Ear: External ear normal.   Nose: Nose normal.   Eyes: Conjunctivae and EOM are normal. Pupils are equal, round, and reactive to light. Right eye exhibits no discharge. Left eye exhibits no discharge. No scleral icterus.   Neck: Normal range of motion. Neck supple. No thyromegaly present. No tracheal deviation present. No JVD present.   Cardiovascular: Normal rate, regular rhythm, normal heart sounds and intact distal pulses. Exam reveals no gallop and no friction rub.    No murmur heard.  Pulmonary/Chest: Breath sounds normal. No stridor. No respiratory distress. She has no wheezes. She has no rhonchi. She has no rales.   Abdominal: Soft. Bowel sounds are normal. She exhibits no distension and no mass. There is tenderness in the right upper quadrant and epigastric area. There is no rebound, no guarding, no tenderness at McBurney's point and negative Jennings's sign.   Musculoskeletal: Normal range of motion. She exhibits no edema or tenderness.   Lymphadenopathy:     She has no cervical adenopathy.   Neurological: She is alert and oriented to person, place, and time. She has normal strength and normal reflexes. She displays normal reflexes. No cranial nerve deficit or sensory deficit.   Skin: Skin is warm and dry. No rash and no abscess noted. No erythema. No pallor.   Psychiatric: She has a normal mood and affect.  Her behavior is normal. Judgment and thought content normal.         ED Course   Procedures  Labs Reviewed   CBC W/ AUTO DIFFERENTIAL - Abnormal; Notable for the following:        Result Value    Gran # 8.4 (*)     Gran% 79.3 (*)     Lymph% 13.7 (*)     All other components within normal limits   COMPREHENSIVE METABOLIC PANEL   LIPASE   POCT URINE PREGNANCY             Medical Decision Making:   Differential Diagnosis:   Small bowel obstruction, pancreatitis, appendicitis  Clinical Tests:   Lab Tests: Ordered and Reviewed  Radiological Study: Ordered and Reviewed  ED Management:  This is a 37-year-old female presenting to the emergency department for evaluation of epigastric and right upper quadrant abdominal pain that woke her from sleep early this morning. She also reports vomiting and nausea. She denies diarrhea, fevers, or any other associated symptoms. She is afebrile and appears uncomfortable and mildly distressed. There is right upper quadrant and epigastric tenderness to palpation. Abdomen is soft without rigidity or guarding. I doubt peritonitis. CBC, CMP, and lipase are unremarkable. Ultrasound remarkable for cholelithiasis and acute cholecystitis positive sonographic Jennings sign without evidence of pneumatosis. Ordered Zosyn 4.5 grams IVPB. Patient's pain and nausea are controlled at this time.    Spoke to Dr. Gatica (general surgery resident). Patient will be admitted to general surgery. Attending will be Dr. Camarillo.  Other:   I have discussed this case with another health care provider.       <> Summary of the Discussion: Case discussed with my attending physician Prosper Colbert who agreed with the assessment and plan.            Scribe Attestation:   Scribe #1: I performed the above scribed service and the documentation accurately describes the services I performed. I attest to the accuracy of the note.    Attending Attestation:           Physician Attestation for Scribe:  Physician Attestation Statement  for Scribe #1: I, Reid Dewey NP, reviewed documentation, as scribed by Celina Pena in my presence, and it is both accurate and complete.                 ED Course     Clinical Impression:   The primary encounter diagnosis was Cholecystitis, acute. A diagnosis of Calculus of gallbladder with acute cholecystitis without obstruction was also pertinent to this visit.    Disposition:   Disposition: Placed in Observation  Condition: Stable                        Reid Dewey NP  08/10/17 2090

## 2017-08-10 NOTE — H&P
"[facility]  History & Physical     Subjective:     Chief Complaint/Reason for Admission: abdominal pain    History of Present Illness:   37F presenting with 3 day history of epigastric and RUQ abdominal pain, nausea. Work up in ED demonstrates normal WBC, bili, and u/s with cholelithiasis, cholecystitis.   Denies fever, chills, diarrhea, emesis.     Patient Active Problem List    Diagnosis Date Noted    Cholecystitis, acute 08/10/2017        Prescriptions Prior to Admission   Medication Sig Dispense Refill Last Dose    labetalol (NORMODYNE) 200 MG tablet Take 1 tablet (200 mg total) by mouth every 12 (twelve) hours. 60 tablet 3 2014     Review of patient's allergies indicates:  No Known Allergies     Past Medical History:   Diagnosis Date    Bronchitis     Hypertension     Migraine headache     Migraines       Past Surgical History:   Procedure Laterality Date     SECTION      TUBAL LIGATION        Family History   Problem Relation Age of Onset    Asthma Mother     Heart disease Father       Social History   Substance Use Topics    Smoking status: Former Smoker    Smokeless tobacco: Never Used    Alcohol use No        Review of Systems:  Pertinent items are noted in HPI.    OBJECTIVE:     Patient Vitals for the past 8 hrs:   BP Temp Temp src Pulse Resp SpO2 Height Weight   08/10/17 1644 (!) 172/86 - - - - - - -   08/10/17 1618 (!) 173/95 98.9 °F (37.2 °C) Oral 68 17 98 % - -   08/10/17 1338 136/80 99 °F (37.2 °C) Oral 62 18 99 % - -   08/10/17 1307 (!) 152/86 98.7 °F (37.1 °C) Oral 69 18 98 % - -   08/10/17 0924 (!) 180/94 98.1 °F (36.7 °C) - 60 18 100 % 5' 9" (1.753 m) 99.8 kg (220 lb)     No distress  EOMI  No jaundice  NCAT  Moist mucous membranes  RRR  No increased work of breathing  Abdomen soft, non distended, tender to deep palpation epigastric and RUQ. Well healed small pfannenstiel scar.     Data Review:  CBC:   Recent Labs  Lab 08/10/17  1012   WBC 10.52   RBC " 4.51   HGB 13.1   HCT 39.2      MCV 87   MCH 29.0   MCHC 33.4     CMP:   Recent Labs  Lab 08/10/17  1012      CALCIUM 9.9   ALBUMIN 3.9   PROT 7.4      K 4.5   CO2 27      BUN 15   CREATININE 1.0   ALKPHOS 113   ALT 13   AST 12   BILITOT 0.6       ASSESSMENT/PLAN:   37F with acute cholecystitis, cholelithiasis, no evidence of choledocholithiasis   NPO, IVF, ABX  OR for lap jagruti    Active Hospital Problems    Diagnosis  POA    Cholecystitis, acute [K81.0]  Yes      Resolved Hospital Problems    Diagnosis Date Resolved POA   No resolved problems to display.

## 2017-08-10 NOTE — ED TRIAGE NOTES
Pt states having middle upper abd pain since last night.  Pt reports pain radiating to the back.    Pt rates pain as 10.  Pt repors n/v this morning only.

## 2017-08-11 VITALS
WEIGHT: 238.69 LBS | DIASTOLIC BLOOD PRESSURE: 66 MMHG | TEMPERATURE: 99 F | OXYGEN SATURATION: 99 % | SYSTOLIC BLOOD PRESSURE: 117 MMHG | BODY MASS INDEX: 35.35 KG/M2 | HEART RATE: 56 BPM | RESPIRATION RATE: 18 BRPM | HEIGHT: 69 IN

## 2017-08-11 PROBLEM — K81.0 CHOLECYSTITIS, ACUTE: Status: RESOLVED | Noted: 2017-08-10 | Resolved: 2017-08-11

## 2017-08-11 LAB
ALBUMIN SERPL BCP-MCNC: 3.1 G/DL
ALP SERPL-CCNC: 91 U/L
ALT SERPL W/O P-5'-P-CCNC: 28 U/L
ANION GAP SERPL CALC-SCNC: 7 MMOL/L
AST SERPL-CCNC: 26 U/L
BASOPHILS # BLD AUTO: 0.02 K/UL
BASOPHILS NFR BLD: 0.2 %
BILIRUB SERPL-MCNC: 0.9 MG/DL
BUN SERPL-MCNC: 11 MG/DL
CALCIUM SERPL-MCNC: 8.4 MG/DL
CHLORIDE SERPL-SCNC: 104 MMOL/L
CO2 SERPL-SCNC: 25 MMOL/L
CREAT SERPL-MCNC: 0.8 MG/DL
DIFFERENTIAL METHOD: ABNORMAL
EOSINOPHIL # BLD AUTO: 0.1 K/UL
EOSINOPHIL NFR BLD: 1.3 %
ERYTHROCYTE [DISTWIDTH] IN BLOOD BY AUTOMATED COUNT: 13.1 %
EST. GFR  (AFRICAN AMERICAN): >60 ML/MIN/1.73 M^2
EST. GFR  (NON AFRICAN AMERICAN): >60 ML/MIN/1.73 M^2
GLUCOSE SERPL-MCNC: 93 MG/DL
HCT VFR BLD AUTO: 33.9 %
HGB BLD-MCNC: 11.1 G/DL
LYMPHOCYTES # BLD AUTO: 2.1 K/UL
LYMPHOCYTES NFR BLD: 19.8 %
MCH RBC QN AUTO: 28.7 PG
MCHC RBC AUTO-ENTMCNC: 32.7 G/DL
MCV RBC AUTO: 88 FL
MONOCYTES # BLD AUTO: 0.6 K/UL
MONOCYTES NFR BLD: 5.2 %
NEUTROPHILS # BLD AUTO: 7.7 K/UL
NEUTROPHILS NFR BLD: 73.4 %
PLATELET # BLD AUTO: 260 K/UL
PMV BLD AUTO: 10.9 FL
POTASSIUM SERPL-SCNC: 4 MMOL/L
PROT SERPL-MCNC: 6 G/DL
RBC # BLD AUTO: 3.87 M/UL
SODIUM SERPL-SCNC: 136 MMOL/L
WBC # BLD AUTO: 10.52 K/UL

## 2017-08-11 PROCEDURE — 25000003 PHARM REV CODE 250: Performed by: STUDENT IN AN ORGANIZED HEALTH CARE EDUCATION/TRAINING PROGRAM

## 2017-08-11 PROCEDURE — 85025 COMPLETE CBC W/AUTO DIFF WBC: CPT

## 2017-08-11 PROCEDURE — 36415 COLL VENOUS BLD VENIPUNCTURE: CPT

## 2017-08-11 PROCEDURE — S0028 INJECTION, FAMOTIDINE, 20 MG: HCPCS | Performed by: STUDENT IN AN ORGANIZED HEALTH CARE EDUCATION/TRAINING PROGRAM

## 2017-08-11 PROCEDURE — 80053 COMPREHEN METABOLIC PANEL: CPT

## 2017-08-11 PROCEDURE — 63600175 PHARM REV CODE 636 W HCPCS: Performed by: NURSE PRACTITIONER

## 2017-08-11 PROCEDURE — 25000003 PHARM REV CODE 250: Performed by: NURSE PRACTITIONER

## 2017-08-11 PROCEDURE — G0378 HOSPITAL OBSERVATION PER HR: HCPCS

## 2017-08-11 RX ORDER — BUTALBITAL, ACETAMINOPHEN AND CAFFEINE 50; 325; 40 MG/1; MG/1; MG/1
1 TABLET ORAL EVERY 4 HOURS PRN
Status: DISCONTINUED | OUTPATIENT
Start: 2017-08-11 | End: 2017-08-11 | Stop reason: HOSPADM

## 2017-08-11 RX ORDER — OXYCODONE AND ACETAMINOPHEN 5; 325 MG/1; MG/1
1 TABLET ORAL EVERY 4 HOURS PRN
Qty: 30 TABLET | Refills: 0 | Status: SHIPPED | OUTPATIENT
Start: 2017-08-11

## 2017-08-11 RX ORDER — BUTALBITAL, ACETAMINOPHEN AND CAFFEINE 50; 325; 40 MG/1; MG/1; MG/1
1 TABLET ORAL EVERY 4 HOURS PRN
Qty: 30 TABLET | Refills: 0 | Status: SHIPPED | OUTPATIENT
Start: 2017-08-11 | End: 2017-09-10

## 2017-08-11 RX ADMIN — OXYCODONE HYDROCHLORIDE AND ACETAMINOPHEN 1 TABLET: 5; 325 TABLET ORAL at 01:08

## 2017-08-11 RX ADMIN — FAMOTIDINE 20 MG: 10 INJECTION INTRAVENOUS at 08:08

## 2017-08-11 RX ADMIN — PIPERACILLIN, TAZOBACTAM 4.5 G: 4; .5 INJECTION, POWDER, LYOPHILIZED, FOR SOLUTION INTRAVENOUS at 01:08

## 2017-08-11 RX ADMIN — OXYCODONE HYDROCHLORIDE AND ACETAMINOPHEN 1 TABLET: 5; 325 TABLET ORAL at 04:08

## 2017-08-11 RX ADMIN — PIPERACILLIN, TAZOBACTAM 4.5 G: 4; .5 INJECTION, POWDER, LYOPHILIZED, FOR SOLUTION INTRAVENOUS at 04:08

## 2017-08-11 RX ADMIN — SODIUM CHLORIDE: 0.9 INJECTION, SOLUTION INTRAVENOUS at 01:08

## 2017-08-11 RX ADMIN — OXYCODONE HYDROCHLORIDE AND ACETAMINOPHEN 1 TABLET: 5; 325 TABLET ORAL at 08:08

## 2017-08-11 RX ADMIN — BUTALBITAL, ACETAMINOPHEN AND CAFFEINE 1 TABLET: 50; 325; 40 TABLET ORAL at 01:08

## 2017-08-11 RX ADMIN — LABETALOL HYDROCHLORIDE 200 MG: 100 TABLET, FILM COATED ORAL at 08:08

## 2017-08-11 NOTE — ANESTHESIA POSTPROCEDURE EVALUATION
"Anesthesia Post Evaluation    Patient: Shady Lopez    Procedure(s) Performed: Procedure(s) (LRB):  CHOLECYSTECTOMY-LAPAROSCOPIC (N/A)    Final Anesthesia Type: general  Patient location during evaluation: PACU  Patient participation: Yes- Able to Participate  Level of consciousness: awake and alert and oriented  Post-procedure vital signs: reviewed and stable  Pain management: adequate  Airway patency: patent  PONV status at discharge: No PONV  Anesthetic complications: no      Cardiovascular status: blood pressure returned to baseline and hemodynamically stable  Respiratory status: unassisted, spontaneous ventilation and room air  Hydration status: euvolemic  Follow-up not needed.        Visit Vitals  BP (!) 148/83   Pulse 65   Temp 36.9 °C (98.4 °F) (Oral)   Resp 18   Ht 5' 9" (1.753 m)   Wt 108.3 kg (238 lb 11.2 oz)   LMP 07/14/2017   SpO2 97%   Breastfeeding? No   BMI 35.25 kg/m²       Pain/Angella Score: Pain Assessment Performed: Yes (8/10/2017  8:10 PM)  Presence of Pain: complains of pain/discomfort (8/10/2017  8:10 PM)  Pain Rating Prior to Med Admin: 6 (8/10/2017 10:33 PM)  Pain Rating Post Med Admin: 1 (8/10/2017  8:10 PM)  Angella Score: 10 (8/10/2017  8:10 PM)      "

## 2017-08-11 NOTE — PLAN OF CARE
Problem: Patient Care Overview  Goal: Plan of Care Review  Outcome: Ongoing (interventions implemented as appropriate)  Patient is post op laproscopic cholecycstectomy.   Pain controlled by prn meds, with addition of pt's home med fioricet requested for Migraine headache treatment.  Pt has remained free of falls and injury,  Safety and infection are reinforced.  Pt tolerates clear liquid fluids,  Refuses apple juice.  No acute changes for night shift.

## 2017-08-11 NOTE — NURSING
Patient arrived to unit from ED via stretcher, accompanied by staff.  Patient oriented to room and call light system. Bed in low position, wheels locked, alarms on and audible.  Potty, position, and pain needs addressed.  No acute distress noted;  Will continue to monitor.

## 2017-08-11 NOTE — NURSING
Patient handoff report from CHEYANNE Sal/PACU.  Laproscopic cholesystectomy.  Lap sites x3,. No drains.

## 2017-08-11 NOTE — PLAN OF CARE
"   08/11/17 1353   Final Note   Assessment Type Final Discharge Note   Discharge Disposition Home   Discharge planning education complete? Yes   What phone number can be called within the next 1-3 days to see how you are doing after discharge? [phone]   Hospital Follow Up  Appt(s) scheduled? Yes   Discharge plans and expectations educations in teach back method with documentation complete? Yes   Right Care Referral Info   Post Acute Recommendation No Care     YOEL met with pt at bedside. SW reviewed discharge education sheet " POST OP SURGERY " with pt. Pt voiced understanding. Teachback method applied with pt. SW also reviewed with pt what she is responsible for in order to manage her health at home.     1) Getting medications taking from pharmacy.  2) Taking medications as prescribed.  3) Making Appointments that are scheduled.    Pt verbalized understanding. Pt cleared to D/C from CM standpoint.    "

## 2017-08-11 NOTE — PLAN OF CARE
[facility] [id number] [facility] [place]  [address] [facility] [address] [facility]  [address]  [place] [address]  Phone: [phone] Fax: [phone]    [id number] [address]  [address]  [place] [address]  Phone: [phone] Fax: [phone]       08/11/17 1347   Discharge Assessment   Assessment Type Discharge Planning Assessment   Confirmed/corrected address and phone number on facesheet? Yes   Assessment information obtained from? Patient   Prior to hospitilization cognitive status: Alert/Oriented   Prior to hospitalization functional status: Independent   Current cognitive status: Alert/Oriented   Current Functional Status: Independent   Arrived From home or self-care   Lives With significant other;child(janna), dependent   How many people do you have in your home that can help with your care after discharge? 2   Who are your caregiver(s) and their phone number(s)? FATHER AND SO   Readmission Within The Last 30 Days no previous admission in last 30 days   Equipment Currently Used at Home none   Do you have any problems affording any of your prescribed medications? No   Is the patient taking medications as prescribed? yes   Do you have any financial concerns preventing you from receiving the healthcare you need? No   Does the patient have transportation to healthcare appointments? Yes   Transportation Available family or friend will provide;public transportation   On Dialysis? No   Discharge Plan A Home with family   Discharge Plan B Home with family   Patient/Family In Agreement With Plan yes

## 2017-08-11 NOTE — PROGRESS NOTES
WRITTEN HEALTHCARE DISCHARGE INFORMATION     Things that YOU are responsible for to Manage Your Care At Home:  1. Getting your prescriptions filled.  2. Taking you medications as directed. DO NOT MISS ANY DOSES!  3. Going to your follow-up doctor appointments. This is important because it allows the doctor to monitor your progress and to determine if any changes need to be made to your treatment plan.    If you are unable to make your follow up appointments, please call the number listed and reschedule this appointment.     After discharge, if you need assistance, you can call [facility] for 24/7 assistance at [phone]    Thank you for choosing [facility] and allowing us to care for you.   From your care management team: Lily DE LA ROSA,RSW & Andree YAÑEZ RN,TN [phone] or [phone]     You should receive a call from [facility] within 48-72 hours to help manage your care after discharge. Please try to make sure that you answer your phone for this important phone call.     Follow-up Information     Guero Camarillo MD. Go on 8/21/2017.    Specialties:  General Surgery, Bariatrics  Why:  For Appointment on Monday 8/21/2017 @ 1PM  Contact information:  [address] [place] [address]  [address]  [place] [facility]  [place] [address]  [phone]

## 2017-08-11 NOTE — NURSING
Patient remained free from falls, trauma, and injuries throughout shift. Complaints of pain controlled with prn analgesics. No complaints of nausea or vomiting. IV fluids, IV antibiotics, and medications administered as prescribed. Vital Signs Stable; afebrile throughout shift. Ambulated to and from bathroom. Tolerated advancement of diet. Lap sites to abdomen clean and intact. IV removed; catheter intact. Discharge instructions, future appointments, and prescriptions given to patient. Educated patient on reasons to return to hospital and provided handout on lap cholecystectomy; patient verbalized understanding. No acute distress noted.

## 2017-08-11 NOTE — TRANSFER OF CARE
"Anesthesia Transfer of Care Note    Patient: Shady Lopez    Procedure(s) Performed: Procedure(s) (LRB):  CHOLECYSTECTOMY-LAPAROSCOPIC (N/A)    Patient location: PACU    Anesthesia Type: general    Transport from OR: Transported from OR on room air with adequate spontaneous ventilation    Post pain: adequate analgesia    Post assessment: no apparent anesthetic complications and tolerated procedure well    Post vital signs: stable    Level of consciousness: awake, alert and oriented    Nausea/Vomiting: no nausea/vomiting    Complications: none    Transfer of care protocol was followed      Last vitals:   Visit Vitals  BP (!) 147/81 (BP Location: Right arm, Patient Position: Lying)   Pulse 66   Temp 36.7 °C (98.1 °F) (Oral)   Resp 18   Ht 5' 9" (1.753 m)   Wt 108.3 kg (238 lb 11.2 oz)   LMP 07/14/2017   SpO2 97%   Breastfeeding? No   BMI 35.25 kg/m²     "

## 2017-08-11 NOTE — PROGRESS NOTES
[facility]  General Surgery  Progress Note    Subjective:     Interval History: doing well, eating, +flatus, pain minimal, eager to go home    Post-Op Info:  Procedure(s) (LRB):  CHOLECYSTECTOMY-LAPAROSCOPIC (N/A)   1 Day Post-Op      Medications:  Continuous Infusions:   sodium chloride 0.9% 125 mL/hr at 08/11/17 0136     Scheduled Meds:   enoxaparin  40 mg Subcutaneous Daily    famotidine (PF)  20 mg Intravenous BID    labetalol  200 mg Oral Q12H    piperacillin-tazobactam 4.5 g in dextrose 5 % 100 mL IVPB (ready to mix system)  4.5 g Intravenous Q8H     PRN Meds:butalbital-acetaminophen-caffeine -40 mg, HYDROmorphone, metoclopramide HCl, morphine, ondansetron, oxycodone-acetaminophen, oxycodone-acetaminophen, promethazine (PHENERGAN) IVPB, sodium chloride 0.9%     Objective:     Vital Signs (Most Recent):  Temp: 98.5 °F (36.9 °C) (08/11/17 1216)  Pulse: (!) 56 (08/11/17 1216)  Resp: 18 (08/11/17 1216)  BP: 117/66 (08/11/17 1216)  SpO2: 99 % (08/11/17 1216) Vital Signs (24h Range):  Temp:  [98.1 °F (36.7 °C)-98.9 °F (37.2 °C)] 98.5 °F (36.9 °C)  Pulse:  [51-72] 56  Resp:  [17-30] 18  SpO2:  [94 %-99 %] 99 %  BP: (117-173)/(60-95) 117/66       Intake/Output Summary (Last 24 hours) at 08/11/17 1508  Last data filed at 08/11/17 1200   Gross per 24 hour   Intake             1580 ml   Output             2000 ml   Net             -420 ml       Physical Exam  No distress  abd incisions intact. Soft, non distended, mild tenderness  Significant Labs:  BMP:   Recent Labs  Lab 08/11/17  0512   GLU 93      K 4.0      CO2 25   BUN 11   CREATININE 0.8   CALCIUM 8.4*     CBC:   Recent Labs  Lab 08/11/17  0512   WBC 10.52   RBC 3.87*   HGB 11.1*   HCT 33.9*      MCV 88   MCH 28.7   MCHC 32.7       Significant Diagnostics:  None    Assessment/Plan:   37F with acute cholecystitis cholelithiasis s/p lap jagruti POD1  Doing well, dc home  Active Diagnoses:    Diagnosis Date Noted POA     Cholecystitis, acute [K81.0] 08/10/2017 Yes      Problems Resolved During this Admission:    Diagnosis Date Noted Date Resolved POA         Tatum Gatica MD  General Surgery  [facility]

## 2017-08-11 NOTE — DISCHARGE SUMMARY
[facility]  Discharge Summary      Admit Date: 8/10/2017    Discharge Date and Time:  08/11/2017 3:10 PM    Attending Physician: Guero Camarillo MD     Reason for Admission: cholelithiasis, cholecystitis    Procedures Performed: Procedure(s) (LRB):  CHOLECYSTECTOMY-LAPAROSCOPIC (N/A)    Hospital Course (synopsis of major diagnoses, care, treatment, and services provided during the course of the hospital stay): presented with acute cholecystitis, cholelithiasis, underwent lap cholecystectomy without issues, dc home pod 1     Consults: none    Significant Diagnostic Studies: U/s    Final Diagnoses:    Principal Problem: <principal problem not specified>   Secondary Diagnoses:   Active Hospital Problems    Diagnosis  POA    Cholecystitis, acute [K81.0]  Yes      Resolved Hospital Problems    Diagnosis Date Resolved POA   No resolved problems to display.       Discharged Condition: good    Disposition: Home or Self Care    Follow Up/Patient Instructions:   Ok to shower  No lifting >10lbs x 4 weeks  Call MD with pain, nausea, fever, wound breakdown  Medications:  Reconciled Home Medications:   Current Discharge Medication List      CONTINUE these medications which have NOT CHANGED    Details   labetalol (NORMODYNE) 200 MG tablet Take 1 tablet (200 mg total) by mouth every 12 (twelve) hours.  Qty: 60 tablet, Refills: 3           No discharge procedures on file.  Follow-up Information     Guero Camarillo MD. Go on 8/21/2017.    Specialties:  General Surgery, Bariatrics  Why:  For Appointment on Monday 8/21/2017 @ 1PM  Contact information:  [address] [place] [address]  [place] [address]  [phone]

## 2017-08-11 NOTE — BRIEF OP NOTE
[facility]  Surgery Department  Operative Note    SUMMARY     Date of Procedure: 8/10/2017     Procedure: Procedure(s) (LRB):  CHOLECYSTECTOMY-LAPAROSCOPIC (N/A)     Surgeon(s) and Role:     * Guero Camarillo MD - Primary    Assisting Surgeon: Gavi     Pre-Operative Diagnosis: Acute cholecystitis [K81.0]    Post-Operative Diagnosis: Post-Op Diagnosis Codes:     * Acute cholecystitis [K81.0]    Anesthesia: General    Technical Procedures Used: lap cholecystectomy    Description of the Findings of the Procedure: see dictation    Significant Surgical Tasks Conducted by the Assistant(s), if Applicable: nones    Complications: No    Estimated Blood Loss (EBL): * No values recorded between 8/10/2017  5:41 PM and 8/10/2017  7:25 PM *           Implants: * No implants in log *    Specimens:   Specimen (12h ago through future)    Start     Ordered    08/10/17 1802  Specimen to Pathology - Surgery  Once     Comments:  Gallbladder and Contents      08/10/17 1802                  Condition: Good    Disposition: PACU - hemodynamically stable.    Attestation: I was present and scrubbed for the entire procedure.

## 2017-08-12 NOTE — OP NOTE
Laparoscopic Cholecystectomy Procedure Note    Pre-operative Diagnosis: Calculus of gallbladder with other cholecystitis, without mention of obstruction    Post-operative Diagnosis: Same    Surgery Date: 8/10/2017     Surgeon: Guero Camarillo     Assistants: Tatum Gatica MD, PGY 4    Anesthesia: General endotracheal anesthesia    ASA Class: 2      Indications: This patient presents with symptomatic gallbladder disease and will undergo laparoscopic cholecystectomy.    The patient was seen again in the Holding Room. The risks and benefits, including but not limited to common bile duct injury were explained to the patient, who acknowledges these risks and wishes to proceed.    Procedure Details   The patient was taken to Operating Room, identified as Shady Lopez and the procedure verified as Laparoscopic Cholecystectomy. A Time Out was held and the above information confirmed.    Prior to the induction of general anesthesia, antibiotic prophylaxis was administered. General endotracheal anesthesia was then administered and tolerated well. After the induction, the abdomen was prepped in the usual sterile fashion. The patient was positioned in the supine position with the arms extended.    All ports were injected with a combination of short and long acting local anesthetic prior to incision.  An incision was made in the umbilicus.  The skin edges were grasped with penetrating towel clips and used to retract the abdominal wall upward.  A Veress needle was inserted into the peritoneal cavity.  Initial pressures were less than 5 mm Hg.  A pneumoperitoneum to 15 mm Hg was obtained.  The abdomen was then entered under direct visualization using an optical viewing 11 mm trochar and a 0 degree laparoscope.  The underlying bowel was inspected and found to be free of injury.  The remaining ports were placed under direct visualization.  All were 5 mm ports: one in the subxiphoid area, one in the right midclavicular line,  and one in the right anterior axillary line.    A locking grasper was introduced through the most lateral port and used to retract the dome of the gallbladder up and over the edge of the liver.  A Harsh grasper was used to retract the infundibulum of the gallbladder laterally.  The triangle of Calot was opened using electrocautery and dissected using Maryland forceps.  Once a single duct and single artery were seen entering the gallbladder, the cystic duct was clipped once proximally, twice distally and divided.  The cystic artery was clipped once proximally, once distally and divided.  The gallbladder was then removed from the gallbladder fossa using electrocautery and removed through the umbilicus.    The right upper quadrant was irrigated until clear.  Hemostasis on the gallbladder fossa was achieved with electrocautery.  The pneumoperitoneum was then expelled from the abdomen, and all ports were removed.  The umbilical fascia was reapproximated with a single figure-of-eight 0 vicryl suture.  The skin over all incisions were closed using 4-0 monocryl deep dermal sutures.  Dermabond was applied and the patient was aroused and transferred to the recovery room in stable condition.    Instrument, sponge, and needle counts were correct at closure and at the conclusion of the case.  There were no immediate complications.    Findings:  Cholecystitis with Cholelithiasis    Estimated Blood Loss: Minimal           Drains: none           Total IV Fluids: see anesthesia records           Specimens: Gallbladder             Complications: None; patient tolerated the procedure well.           Disposition: PACU - hemodynamically stable.           Condition: stable

## 2018-03-07 ENCOUNTER — HOSPITAL ENCOUNTER (EMERGENCY)
Facility: HOSPITAL | Age: 38
Discharge: HOME | End: 2018-03-07
Attending: EMERGENCY MEDICINE
Payer: MEDICAID

## 2018-03-07 VITALS
OXYGEN SATURATION: 97 % | BODY MASS INDEX: 32.58 KG/M2 | TEMPERATURE: 99 F | HEIGHT: 69 IN | HEART RATE: 62 BPM | RESPIRATION RATE: 16 BRPM | WEIGHT: 220 LBS | SYSTOLIC BLOOD PRESSURE: 147 MMHG | DIASTOLIC BLOOD PRESSURE: 87 MMHG

## 2018-03-07 DIAGNOSIS — I10 ASYMPTOMATIC HYPERTENSION: ICD-10-CM

## 2018-03-07 DIAGNOSIS — M79.641 RIGHT HAND PAIN: Primary | ICD-10-CM

## 2018-03-07 LAB
B-HCG UR QL: NEGATIVE
CTP QC/QA: YES

## 2018-03-07 PROCEDURE — 99284 EMERGENCY DEPT VISIT MOD MDM: CPT

## 2018-03-07 PROCEDURE — 25000003 PHARM REV CODE 250: Performed by: PHYSICIAN ASSISTANT

## 2018-03-07 PROCEDURE — 81025 URINE PREGNANCY TEST: CPT | Performed by: EMERGENCY MEDICINE

## 2018-03-07 RX ORDER — ALBUTEROL SULFATE 5 MG/ML
2.5 SOLUTION RESPIRATORY (INHALATION) EVERY 6 HOURS PRN
COMMUNITY

## 2018-03-07 RX ORDER — IBUPROFEN 600 MG/1
600 TABLET ORAL EVERY 6 HOURS PRN
Qty: 20 TABLET | Refills: 0 | Status: ON HOLD | OUTPATIENT
Start: 2018-03-07 | End: 2020-09-03 | Stop reason: HOSPADM

## 2018-03-07 RX ORDER — MONTELUKAST SODIUM 10 MG/1
10 TABLET ORAL NIGHTLY
COMMUNITY

## 2018-03-07 RX ORDER — IBUPROFEN 600 MG/1
600 TABLET ORAL
Status: COMPLETED | OUTPATIENT
Start: 2018-03-07 | End: 2018-03-07

## 2018-03-07 RX ORDER — FLUTICASONE PROPIONATE 50 MCG
1 SPRAY, SUSPENSION (ML) NASAL DAILY
COMMUNITY

## 2018-03-07 RX ORDER — FERROUS SULFATE 324(65)MG
325 TABLET, DELAYED RELEASE (ENTERIC COATED) ORAL DAILY
COMMUNITY

## 2018-03-07 RX ADMIN — IBUPROFEN 600 MG: 600 TABLET, FILM COATED ORAL at 02:03

## 2018-03-07 NOTE — ED PROVIDER NOTES
"Encounter Date: 3/7/2018    SCRIBE #1 NOTE: I, Noa Cooper, am scribing for, and in the presence of,  Beto Guajardo PA-C. I have scribed the following portions of the note - Other sections scribed: HPI and ROS.       History     Chief Complaint   Patient presents with    Hand Pain     States she has right hand pain and can't bend her right middle finger     CC: Hand Pain    HPI: This right handed 37 y.o female who has Bronchitis, Hypertension, and Migraine headaches presents to the ED c/o acute, constant, 8/10 right hand pain for the past 2 days.  Patient also reports of associated swelling. Patient reports the pain began after she began having a cramping sensation while cooking.  Patient reports shaking her hand to alleviate the cramp, when she heard a "pop".  Patient reports the pain is worse with movement and reports it intermittently radiating up her right forearm when attempting to close her hand.  Patient reports some relief with elevation of the hand.  Patient reports a h/o carpal tunnel, but reports her current pain is different from her carpal tunnel.  Patient reports attempting treatment with her previously prescribed Ultram and reports taking her last one this morning.  Patient denies fever, chills, extremity numbness, extremity weakness, rash, or any other associated symptoms.  No alleviating factors.       The history is provided by the patient. No  was used.     Review of patient's allergies indicates:  No Known Allergies  Past Medical History:   Diagnosis Date    Bronchitis     Hypertension     Migraine headache     Migraines      Past Surgical History:   Procedure Laterality Date     SECTION      GALLBLADDER SURGERY  2017    TUBAL LIGATION       Family History   Problem Relation Age of Onset    Asthma Mother     Heart disease Father      Social History   Substance Use Topics    Smoking status: Former Smoker     Years: 10.00     Quit date: 3/1/2008    " Smokeless tobacco: Never Used    Alcohol use 1.2 oz/week     2 Cans of beer per week      Comment: ocassionally     Review of Systems   Constitutional: Negative for chills and fever.   HENT: Negative for ear pain and sore throat.    Respiratory: Negative for cough and shortness of breath.    Cardiovascular: Negative for chest pain.   Gastrointestinal: Negative for abdominal pain, diarrhea, nausea and vomiting.   Musculoskeletal: Positive for arthralgias. Negative for back pain.   Skin: Negative for rash.   Neurological: Negative for weakness, numbness and headaches.       Physical Exam     Initial Vitals [03/07/18 1220]   BP Pulse Resp Temp SpO2   (!) 185/102 62 16 98.4 °F (36.9 °C) 99 %      MAP       129.67         Physical Exam    Vitals reviewed.  Constitutional: She appears well-developed and well-nourished. She is not diaphoretic. No distress.   HENT:   Head: Normocephalic and atraumatic.   Right Ear: External ear normal.   Left Ear: External ear normal.   Nose: Nose normal.   Eyes: Conjunctivae are normal. No scleral icterus.   Neck: Normal range of motion. Neck supple.   Cardiovascular: Normal rate, regular rhythm and intact distal pulses.   Pulmonary/Chest: No respiratory distress.   Musculoskeletal: She exhibits edema and tenderness.   Tenderness with mild edema to the right MCP joints, 3, 4, and 5.  No edema or warmth.  No skin lesions or rash.  No tenderness to the wrist, thumb, or palmar aspect.  Negative Finkelstein, negative Tinel sign.     Lymphadenopathy:     She has no cervical adenopathy.   Neurological: She is alert and oriented to person, place, and time. No sensory deficit.   Skin: Skin is warm and dry. Capillary refill takes less than 2 seconds. No rash noted. No erythema.         ED Course   Procedures  Labs Reviewed   POCT URINE PREGNANCY          X-Rays:   Independently Interpreted Readings:   Other Readings:  X-ray of the right hand shows no evidence of fracture or bony  erosion.    Medical Decision Making:   Initial Assessment:   37-year-old female with hypertension complains of nontraumatic right hand pain since last night.  She denies fever or chills.  She presents in no distress, afebrile, with initially elevated blood pressure.  She has some tenderness and edema to 3, 4, 5 MCP joints with no evidence of infection, deformity, or compartment syndrome.  Differential Diagnosis:   Inflammatory arthritis, crystal arthropathy, and less likely infection, fracture, contusion  ED Management:  X-ray negative for fracture or bony erosions.  I am unsure of the cause of patient's pain and swelling, but do not suspect emergent pathology that requires antibiotics, surgery, or admission at this time.  Patient treated with Ace wrap and NSAIDs and provided orthopedic, as well as rheumatology follow-up information for further evaluation.  She verbalized understanding and agreed with plan.  Regarding patient's blood pressure, on reevaluation it is much improved.  She has no clinical evidence of target organ damage.  I do not think further workup or treatment is indicated at this time.            Scribe Attestation:   Scribe #1: I performed the above scribed service and the documentation accurately describes the services I performed. I attest to the accuracy of the note.    Attending Attestation:           Physician Attestation for Scribe:  Physician Attestation Statement for Scribe #1: I, Beto Guajardo PA-C, reviewed documentation, as scribed by Noa Cooper in my presence, and it is both accurate and complete.                    Clinical Impression:   The primary encounter diagnosis was Right hand pain. A diagnosis of Asymptomatic hypertension was also pertinent to this visit.                           Beto Guajardo PA-C  03/07/18 1456       Beto Guajardo PA-C  03/07/18 1451

## 2018-03-07 NOTE — ED TRIAGE NOTES
"Patient states " I was cooking Monday night 3/5/18 and my right hand cramp, I shook it out, but the next I notice it was swollen to the point I could move any of my fingers, today I the only I can only move my right index finger and thumb.  It hurts badly."  Pt rates pain now at resting 8/10.  "

## 2020-08-27 ENCOUNTER — HOSPITAL ENCOUNTER (EMERGENCY)
Facility: HOSPITAL | Age: 40
Discharge: HOME | End: 2020-08-27
Attending: EMERGENCY MEDICINE
Payer: MEDICAID

## 2020-08-27 VITALS
WEIGHT: 245.38 LBS | RESPIRATION RATE: 16 BRPM | OXYGEN SATURATION: 98 % | SYSTOLIC BLOOD PRESSURE: 144 MMHG | BODY MASS INDEX: 36.34 KG/M2 | DIASTOLIC BLOOD PRESSURE: 67 MMHG | TEMPERATURE: 98 F | HEART RATE: 53 BPM | HEIGHT: 69 IN

## 2020-08-27 DIAGNOSIS — E87.6 HYPOKALEMIA: ICD-10-CM

## 2020-08-27 DIAGNOSIS — R51.9 ACUTE NONINTRACTABLE HEADACHE, UNSPECIFIED HEADACHE TYPE: ICD-10-CM

## 2020-08-27 DIAGNOSIS — R10.13 EPIGASTRIC PAIN: ICD-10-CM

## 2020-08-27 DIAGNOSIS — R11.10 VOMITING, INTRACTABILITY OF VOMITING NOT SPECIFIED, PRESENCE OF NAUSEA NOT SPECIFIED, UNSPECIFIED VOMITING TYPE: Primary | ICD-10-CM

## 2020-08-27 LAB
ALBUMIN SERPL BCP-MCNC: 5 G/DL (ref 3.5–5.2)
ALP SERPL-CCNC: 126 U/L (ref 55–135)
ALT SERPL W/O P-5'-P-CCNC: 26 U/L (ref 10–44)
ANION GAP SERPL CALC-SCNC: 15 MMOL/L (ref 8–16)
AST SERPL-CCNC: 27 U/L (ref 10–40)
B-HCG UR QL: NEGATIVE
BACTERIA #/AREA URNS HPF: NORMAL /HPF
BASOPHILS # BLD AUTO: 0.01 K/UL (ref 0–0.2)
BASOPHILS NFR BLD: 0.1 % (ref 0–1.9)
BILIRUB SERPL-MCNC: 1.2 MG/DL (ref 0.1–1)
BILIRUB UR QL STRIP: NEGATIVE
BUN SERPL-MCNC: 16 MG/DL (ref 6–20)
CALCIUM SERPL-MCNC: 10.5 MG/DL (ref 8.7–10.5)
CHLORIDE SERPL-SCNC: 94 MMOL/L (ref 95–110)
CLARITY UR: CLEAR
CO2 SERPL-SCNC: 29 MMOL/L (ref 23–29)
COLOR UR: YELLOW
CREAT SERPL-MCNC: 0.9 MG/DL (ref 0.5–1.4)
CTP QC/QA: YES
DIFFERENTIAL METHOD: ABNORMAL
EOSINOPHIL # BLD AUTO: 0 K/UL (ref 0–0.5)
EOSINOPHIL NFR BLD: 0.2 % (ref 0–8)
ERYTHROCYTE [DISTWIDTH] IN BLOOD BY AUTOMATED COUNT: 12.9 % (ref 11.5–14.5)
EST. GFR  (AFRICAN AMERICAN): >60 ML/MIN/1.73 M^2
EST. GFR  (NON AFRICAN AMERICAN): >60 ML/MIN/1.73 M^2
GLUCOSE SERPL-MCNC: 116 MG/DL (ref 70–110)
GLUCOSE UR QL STRIP: NEGATIVE
HCT VFR BLD AUTO: 41.1 % (ref 37–48.5)
HGB BLD-MCNC: 14.4 G/DL (ref 12–16)
HGB UR QL STRIP: NEGATIVE
HYALINE CASTS #/AREA URNS LPF: 1 /LPF
IMM GRANULOCYTES # BLD AUTO: 0.03 K/UL (ref 0–0.04)
IMM GRANULOCYTES NFR BLD AUTO: 0.2 % (ref 0–0.5)
KETONES UR QL STRIP: ABNORMAL
LEUKOCYTE ESTERASE UR QL STRIP: NEGATIVE
LIPASE SERPL-CCNC: 27 U/L (ref 4–60)
LYMPHOCYTES # BLD AUTO: 2.2 K/UL (ref 1–4.8)
LYMPHOCYTES NFR BLD: 17.4 % (ref 18–48)
MAGNESIUM SERPL-MCNC: 2.2 MG/DL (ref 1.6–2.6)
MCH RBC QN AUTO: 29.3 PG (ref 27–31)
MCHC RBC AUTO-ENTMCNC: 35 G/DL (ref 32–36)
MCV RBC AUTO: 84 FL (ref 82–98)
MICROSCOPIC COMMENT: NORMAL
MONOCYTES # BLD AUTO: 0.8 K/UL (ref 0.3–1)
MONOCYTES NFR BLD: 6.4 % (ref 4–15)
NEUTROPHILS # BLD AUTO: 9.5 K/UL (ref 1.8–7.7)
NEUTROPHILS NFR BLD: 75.7 % (ref 38–73)
NITRITE UR QL STRIP: NEGATIVE
NRBC BLD-RTO: 0 /100 WBC
PH UR STRIP: 5 [PH] (ref 5–8)
PLATELET # BLD AUTO: 425 K/UL (ref 150–350)
PMV BLD AUTO: 10.5 FL (ref 9.2–12.9)
POTASSIUM SERPL-SCNC: 2.7 MMOL/L (ref 3.5–5.1)
PROT SERPL-MCNC: 8.2 G/DL (ref 6–8.4)
PROT UR QL STRIP: ABNORMAL
RBC # BLD AUTO: 4.92 M/UL (ref 4–5.4)
RBC #/AREA URNS HPF: 0 /HPF (ref 0–4)
SODIUM SERPL-SCNC: 138 MMOL/L (ref 136–145)
SP GR UR STRIP: 1.02 (ref 1–1.03)
URN SPEC COLLECT METH UR: ABNORMAL
UROBILINOGEN UR STRIP-ACNC: ABNORMAL EU/DL
WBC # BLD AUTO: 12.54 K/UL (ref 3.9–12.7)
WBC #/AREA URNS HPF: 2 /HPF (ref 0–5)

## 2020-08-27 PROCEDURE — 99284 EMERGENCY DEPT VISIT MOD MDM: CPT | Mod: 25

## 2020-08-27 PROCEDURE — 81025 URINE PREGNANCY TEST: CPT | Performed by: PHYSICIAN ASSISTANT

## 2020-08-27 PROCEDURE — 85025 COMPLETE CBC W/AUTO DIFF WBC: CPT

## 2020-08-27 PROCEDURE — 96365 THER/PROPH/DIAG IV INF INIT: CPT

## 2020-08-27 PROCEDURE — 96375 TX/PRO/DX INJ NEW DRUG ADDON: CPT

## 2020-08-27 PROCEDURE — 25000003 PHARM REV CODE 250: Performed by: PHYSICIAN ASSISTANT

## 2020-08-27 PROCEDURE — S0028 INJECTION, FAMOTIDINE, 20 MG: HCPCS | Performed by: PHYSICIAN ASSISTANT

## 2020-08-27 PROCEDURE — 96361 HYDRATE IV INFUSION ADD-ON: CPT

## 2020-08-27 PROCEDURE — 83735 ASSAY OF MAGNESIUM: CPT

## 2020-08-27 PROCEDURE — 81000 URINALYSIS NONAUTO W/SCOPE: CPT

## 2020-08-27 PROCEDURE — 83690 ASSAY OF LIPASE: CPT

## 2020-08-27 PROCEDURE — 96376 TX/PRO/DX INJ SAME DRUG ADON: CPT

## 2020-08-27 PROCEDURE — 80053 COMPREHEN METABOLIC PANEL: CPT

## 2020-08-27 PROCEDURE — 63600175 PHARM REV CODE 636 W HCPCS: Performed by: PHYSICIAN ASSISTANT

## 2020-08-27 RX ORDER — POTASSIUM CHLORIDE 20 MEQ/1
40 TABLET, EXTENDED RELEASE ORAL 3 TIMES DAILY
Qty: 30 TABLET | Refills: 0 | Status: SHIPPED | OUTPATIENT
Start: 2020-08-27 | End: 2020-08-28

## 2020-08-27 RX ORDER — KETOROLAC TROMETHAMINE 30 MG/ML
10 INJECTION, SOLUTION INTRAMUSCULAR; INTRAVENOUS
Status: COMPLETED | OUTPATIENT
Start: 2020-08-27 | End: 2020-08-27

## 2020-08-27 RX ORDER — TOPIRAMATE 50 MG/1
50 TABLET, FILM COATED ORAL 2 TIMES DAILY
Qty: 20 TABLET | Refills: 0 | Status: SHIPPED | OUTPATIENT
Start: 2020-08-27 | End: 2024-02-17

## 2020-08-27 RX ORDER — FAMOTIDINE 10 MG/ML
20 INJECTION INTRAVENOUS
Status: COMPLETED | OUTPATIENT
Start: 2020-08-27 | End: 2020-08-27

## 2020-08-27 RX ORDER — BUTALBITAL, ACETAMINOPHEN AND CAFFEINE 50; 325; 40 MG/1; MG/1; MG/1
1 TABLET ORAL EVERY 6 HOURS PRN
Qty: 11 TABLET | Refills: 0 | Status: SHIPPED | OUTPATIENT
Start: 2020-08-27 | End: 2020-08-27 | Stop reason: ALTCHOICE

## 2020-08-27 RX ORDER — POTASSIUM CHLORIDE 7.45 MG/ML
10 INJECTION INTRAVENOUS
Status: COMPLETED | OUTPATIENT
Start: 2020-08-27 | End: 2020-08-27

## 2020-08-27 RX ORDER — ONDANSETRON 2 MG/ML
4 INJECTION INTRAMUSCULAR; INTRAVENOUS
Status: COMPLETED | OUTPATIENT
Start: 2020-08-27 | End: 2020-08-27

## 2020-08-27 RX ORDER — ONDANSETRON 4 MG/1
4 TABLET, FILM COATED ORAL EVERY 6 HOURS PRN
Qty: 12 TABLET | Refills: 0 | Status: SHIPPED | OUTPATIENT
Start: 2020-08-27 | End: 2023-11-06

## 2020-08-27 RX ORDER — POTASSIUM CHLORIDE 20 MEQ/1
40 TABLET, EXTENDED RELEASE ORAL
Status: COMPLETED | OUTPATIENT
Start: 2020-08-27 | End: 2020-08-27

## 2020-08-27 RX ADMIN — KETOROLAC TROMETHAMINE 10 MG: 30 INJECTION, SOLUTION INTRAMUSCULAR at 12:08

## 2020-08-27 RX ADMIN — POTASSIUM CHLORIDE 10 MEQ: 7.46 INJECTION, SOLUTION INTRAVENOUS at 10:08

## 2020-08-27 RX ADMIN — ONDANSETRON 4 MG: 2 INJECTION INTRAMUSCULAR; INTRAVENOUS at 09:08

## 2020-08-27 RX ADMIN — SODIUM CHLORIDE 1000 ML: 0.9 INJECTION, SOLUTION INTRAVENOUS at 08:08

## 2020-08-27 RX ADMIN — ONDANSETRON 4 MG: 2 INJECTION INTRAMUSCULAR; INTRAVENOUS at 10:08

## 2020-08-27 RX ADMIN — FAMOTIDINE 20 MG: 10 INJECTION INTRAVENOUS at 10:08

## 2020-08-27 RX ADMIN — POTASSIUM CHLORIDE 40 MEQ: 1500 TABLET, EXTENDED RELEASE ORAL at 11:08

## 2020-08-27 NOTE — ED PROVIDER NOTES
"Encounter Date: 2020    SCRIBE #1 NOTE: I, Caitie Garza, am scribing for, and in the presence of,  CLYDE Tariq. I have scribed the following portions of the note - Other sections scribed: HPI, ROS, PE.       History     Chief Complaint   Patient presents with    Vomiting     here for eval of vomiting onset Tuesday. Pt states since gallbladder removed in  she has episodes of vomiting off and on with dehydration.      This is a 40 y.o. female with a PMHx of HTN and migraine headaches who presents to the ED complaining of epigastric pain, nausea, and vomiting that started 3 days ago. She reports that she is unable to estimate how many episodes of emesis she's had per day, but states that she "throws up all day". She notes that this is her 5th episode of nausea and emesis that she's had since her cholecystectomy in . She reports that she was recently admitted at [facility] with similar symptoms in February and was diagnosed with Pyelonephritis. Denies fever, rhinorrhea, sore throat, cough, diarrhea, and any urinary symptoms. No other associated symptoms.     The history is provided by the patient. No  was used.     Review of patient's allergies indicates:  No Known Allergies  Past Medical History:   Diagnosis Date    Bronchitis     Hypertension     Migraine headache     Migraines      Past Surgical History:   Procedure Laterality Date     SECTION      GALLBLADDER SURGERY  2017    TUBAL LIGATION       Family History   Problem Relation Age of Onset    Asthma Mother     Heart disease Father      Social History     Tobacco Use    Smoking status: Former Smoker     Years: 10.00     Quit date: 3/1/2008     Years since quittin.4    Smokeless tobacco: Never Used   Substance Use Topics    Alcohol use: Yes     Alcohol/week: 2.0 standard drinks     Types: 2 Cans of beer per week     Comment: ocassionally    Drug use: No     Review of Systems   Constitutional: Negative " for fever.   HENT: Negative for rhinorrhea and sore throat.    Respiratory: Negative for cough and shortness of breath.    Cardiovascular: Negative for chest pain.   Gastrointestinal: Positive for abdominal pain, nausea and vomiting. Negative for diarrhea.   Genitourinary: Negative for difficulty urinating and dysuria.   Musculoskeletal: Negative for back pain.   Skin: Negative for rash.   Neurological: Negative for weakness.   Hematological: Does not bruise/bleed easily.       Physical Exam     Initial Vitals [08/27/20 0837]   BP Pulse Resp Temp SpO2   (!) 140/90 96 16 98.6 °F (37 °C) 100 %      MAP       --         Physical Exam    Nursing note and vitals reviewed.  Constitutional: She appears well-developed and well-nourished. No distress.   HENT:   Head: Normocephalic and atraumatic.   Right Ear: Tympanic membrane normal.   Left Ear: Tympanic membrane normal.   Nose: Nose normal.   Mouth/Throat: Uvula is midline, oropharynx is clear and moist and mucous membranes are normal.   Eyes: EOM are normal. Pupils are equal, round, and reactive to light.   Neck: Trachea normal, normal range of motion, full passive range of motion without pain and phonation normal. Neck supple. No stridor present. No spinous process tenderness and no muscular tenderness present. Normal range of motion present. No neck rigidity.   Cardiovascular: Normal rate, regular rhythm and normal heart sounds. Exam reveals no gallop and no friction rub.    No murmur heard.  Pulmonary/Chest: Effort normal and breath sounds normal. No respiratory distress. She has no wheezes. She has no rhonchi. She has no rales.   Abdominal: Soft. Bowel sounds are normal. There is abdominal tenderness (mild) in the epigastric area. There is no rebound and no guarding.   Musculoskeletal: Normal range of motion.   Neurological: She is alert and oriented to person, place, and time. She has normal strength. No cranial nerve deficit or sensory deficit.   Skin: Skin is warm  and dry. Capillary refill takes less than 2 seconds.   Psychiatric: She has a normal mood and affect.         ED Course   Procedures  Labs Reviewed   URINALYSIS, REFLEX TO URINE CULTURE - Abnormal; Notable for the following components:       Result Value    Protein, UA 1+ (*)     Ketones, UA Trace (*)     Urobilinogen, UA 2.0-3.0 (*)     All other components within normal limits    Narrative:     Specimen Source->Urine   CBC W/ AUTO DIFFERENTIAL - Abnormal; Notable for the following components:    Platelets 425 (*)     Gran # (ANC) 9.5 (*)     Gran% 75.7 (*)     Lymph% 17.4 (*)     All other components within normal limits   COMPREHENSIVE METABOLIC PANEL - Abnormal; Notable for the following components:    Potassium 2.7 (*)     Chloride 94 (*)     Glucose 116 (*)     Total Bilirubin 1.2 (*)     All other components within normal limits    Narrative:     POTASSIUM critical result(s) called and verbal readback obtained from   DR. AVALOS by [facility] [place] 08/27/2020 10:15   LIPASE   URINALYSIS MICROSCOPIC    Narrative:     Specimen Source->Urine   MAGNESIUM   MAGNESIUM   POCT URINE PREGNANCY          Imaging Results    None          Medical Decision Making:   Clinical Tests:   Lab Tests: Ordered and Reviewed  ED Management:  Hemodynamically stable.  Nontoxic and in no acute distress.  Patient is overall well-appearing, pleasant, conversational.  Will obtain labs, give IV fluids and Zofran and reassess.  UA without infection.  Hypokalemia of 2.7.  Will replete with IV potassium and p.o. potassium if patient passes p.o. challenge.  Patient tolerating p.o. fluids without issue after 2nd dose of Zofran.  Patient tolerated potassium p.o. as well.  Patient reports she is feeling much better.  Will discharge home with Zofran, potassium with close PCP follow-up.  Patient verbalizes understanding and is agreeable with plan.  Strict ED return precautions given for any worsening or additional concerning symptoms.  Patient states that she  is out of her migraine medication as well and that her insurance will not cover her fioricet. States that she takes Topamax for her migraines.  Will discharge home with short course of Topamax instructed patient to follow-up with her PCP.            Scribe Attestation:   Scribe #1: I performed the above scribed service and the documentation accurately describes the services I performed. I attest to the accuracy of the note.                          Clinical Impression:       ICD-10-CM ICD-9-CM   1. Vomiting, intractability of vomiting not specified, presence of nausea not specified, unspecified vomiting type  R11.10 787.03   2. Epigastric pain  R10.13 789.06   3. Acute nonintractable headache, unspecified headache type  R51 784.0   4. Hypokalemia  E87.6 276.8         Disposition:   Disposition: Discharged  Condition: Stable     ED Disposition Condition    Discharge Stable        ED Prescriptions     Medication Sig Dispense Start Date End Date Auth. Provider    ondansetron (ZOFRAN) 4 MG tablet Take 1 tablet (4 mg total) by mouth every 6 (six) hours as needed. 12 tablet 8/27/2020  Guero Burgess PA-C    potassium chloride SA (K-DUR,KLOR-CON) 20 MEQ tablet Take 2 tablets (40 mEq total) by mouth 3 (three) times daily. for 3 doses 30 tablet 8/27/2020 8/28/2020 Guero Burgess PA-C    butalbital-acetaminophen-caffeine -40 mg (FIORICET, ESGIC) -40 mg per tablet  (Status: Discontinued) Take 1 tablet by mouth every 6 (six) hours as needed for Pain. 11 tablet 8/27/2020 8/27/2020 Guero Burgess PA-C    topiramate (TOPAMAX) 50 MG tablet Take 1 tablet (50 mg total) by mouth 2 (two) times daily. for 10 days 20 tablet 8/27/2020 9/6/2020 Guero Burgess PA-C        Follow-up Information     Follow up With Specialties Details Why Contact Info    Bibi Rich MD Family Medicine Schedule an appointment as soon as possible for a visit   [address]  [place] [address] [phone]      [facility] Emergency Medicine Schedule an appointment as soon as possible for a visit   [facility] [place] [place] [address] [phone]                      Bailey Attestation: I, Guero Burgess, personally performed the services described in this documentation. All medical record entries made by the scribe were at my direction and in my presence. I have reviewed the chart and agree that the record reflects my personal performance and is accurate and complete.                 Guero Burgess PA-C  08/27/20 1530

## 2020-08-27 NOTE — DISCHARGE INSTRUCTIONS
Please take new medications as directed and follow discharge instructions provided.  Please make sure to follow-up with her PCP to discuss today's emergency department visit and for further evaluation and management.  Please return to the emergency department immediately if her symptoms worsen or you develop any additional concerning symptoms.

## 2020-08-27 NOTE — ED TRIAGE NOTES
Patient reports N/V that started on Tuesday, states had gallbladder removed in 2017 and has bouts of N/V after.

## 2020-08-27 NOTE — Clinical Note
Shady Lopez was seen and treated in our emergency department on 8/27/2020.  She may return to work on 08/29/2020.       If you have any questions or concerns, please don't hesitate to call.      Guero Burgess PA-C

## 2020-09-02 ENCOUNTER — HOSPITAL ENCOUNTER (OUTPATIENT)
Facility: HOSPITAL | Age: 40
Discharge: HOME | End: 2020-09-03
Attending: EMERGENCY MEDICINE | Admitting: HOSPITALIST
Payer: MEDICAID

## 2020-09-02 DIAGNOSIS — R00.0 TACHYCARDIA: ICD-10-CM

## 2020-09-02 DIAGNOSIS — R10.13 EPIGASTRIC PAIN: ICD-10-CM

## 2020-09-02 DIAGNOSIS — R11.2 INTRACTABLE VOMITING WITH NAUSEA, UNSPECIFIED VOMITING TYPE: ICD-10-CM

## 2020-09-02 DIAGNOSIS — E87.8 HYPOCHLOREMIA: ICD-10-CM

## 2020-09-02 DIAGNOSIS — E87.1 HYPONATREMIA: ICD-10-CM

## 2020-09-02 DIAGNOSIS — R17 SERUM TOTAL BILIRUBIN ELEVATED: ICD-10-CM

## 2020-09-02 DIAGNOSIS — E86.0 DEHYDRATION: Primary | ICD-10-CM

## 2020-09-02 DIAGNOSIS — R07.9 CHEST PAIN: ICD-10-CM

## 2020-09-02 PROBLEM — D72.829 LEUKOCYTOSIS: Status: ACTIVE | Noted: 2020-09-02

## 2020-09-02 PROBLEM — G43.909 MIGRAINES: Status: ACTIVE | Noted: 2020-09-02

## 2020-09-02 PROBLEM — I10 ESSENTIAL HYPERTENSION: Status: ACTIVE | Noted: 2020-09-02

## 2020-09-02 LAB
ALBUMIN SERPL BCP-MCNC: 5.1 G/DL (ref 3.5–5.2)
ALP SERPL-CCNC: 166 U/L (ref 55–135)
ALT SERPL W/O P-5'-P-CCNC: 32 U/L (ref 10–44)
AMPHET+METHAMPHET UR QL: NEGATIVE
ANION GAP SERPL CALC-SCNC: 13 MMOL/L (ref 8–16)
AST SERPL-CCNC: 19 U/L (ref 10–40)
B-HCG UR QL: NEGATIVE
BACTERIA #/AREA URNS HPF: ABNORMAL /HPF
BARBITURATES UR QL SCN>200 NG/ML: NEGATIVE
BASOPHILS # BLD AUTO: 0.04 K/UL (ref 0–0.2)
BASOPHILS NFR BLD: 0.2 % (ref 0–1.9)
BENZODIAZ UR QL SCN>200 NG/ML: NEGATIVE
BILIRUB SERPL-MCNC: 2.3 MG/DL (ref 0.1–1)
BILIRUB UR QL STRIP: NEGATIVE
BUN SERPL-MCNC: 23 MG/DL (ref 6–20)
BZE UR QL SCN: NEGATIVE
CALCIUM SERPL-MCNC: 10.7 MG/DL (ref 8.7–10.5)
CANNABINOIDS UR QL SCN: NORMAL
CHLORIDE SERPL-SCNC: 94 MMOL/L (ref 95–110)
CK SERPL-CCNC: 35 U/L (ref 20–180)
CK SERPL-CCNC: 35 U/L (ref 20–180)
CLARITY UR: CLEAR
CO2 SERPL-SCNC: 23 MMOL/L (ref 23–29)
COLOR UR: ABNORMAL
CREAT SERPL-MCNC: 1.2 MG/DL (ref 0.5–1.4)
CREAT UR-MCNC: 237.2 MG/DL (ref 15–325)
CTP QC/QA: YES
DIFFERENTIAL METHOD: ABNORMAL
EOSINOPHIL # BLD AUTO: 0.1 K/UL (ref 0–0.5)
EOSINOPHIL NFR BLD: 0.7 % (ref 0–8)
ERYTHROCYTE [DISTWIDTH] IN BLOOD BY AUTOMATED COUNT: 12.9 % (ref 11.5–14.5)
EST. GFR  (AFRICAN AMERICAN): >60 ML/MIN/1.73 M^2
EST. GFR  (NON AFRICAN AMERICAN): 56 ML/MIN/1.73 M^2
GLUCOSE SERPL-MCNC: 108 MG/DL (ref 70–110)
GLUCOSE UR QL STRIP: NEGATIVE
HCT VFR BLD AUTO: 48.6 % (ref 37–48.5)
HGB BLD-MCNC: 16.5 G/DL (ref 12–16)
HGB UR QL STRIP: NEGATIVE
HYALINE CASTS #/AREA URNS LPF: 10 /LPF
IMM GRANULOCYTES # BLD AUTO: 0.06 K/UL (ref 0–0.04)
IMM GRANULOCYTES NFR BLD AUTO: 0.3 % (ref 0–0.5)
KETONES UR QL STRIP: ABNORMAL
LACTATE SERPL-SCNC: 1.6 MMOL/L (ref 0.5–2.2)
LEUKOCYTE ESTERASE UR QL STRIP: NEGATIVE
LIPASE SERPL-CCNC: 45 U/L (ref 4–60)
LYMPHOCYTES # BLD AUTO: 2.2 K/UL (ref 1–4.8)
LYMPHOCYTES NFR BLD: 12.1 % (ref 18–48)
MCH RBC QN AUTO: 28.5 PG (ref 27–31)
MCHC RBC AUTO-ENTMCNC: 34 G/DL (ref 32–36)
MCV RBC AUTO: 84 FL (ref 82–98)
METHADONE UR QL SCN>300 NG/ML: NEGATIVE
MICROSCOPIC COMMENT: ABNORMAL
MONOCYTES # BLD AUTO: 1 K/UL (ref 0.3–1)
MONOCYTES NFR BLD: 5.4 % (ref 4–15)
NEUTROPHILS # BLD AUTO: 15 K/UL (ref 1.8–7.7)
NEUTROPHILS NFR BLD: 81.3 % (ref 38–73)
NITRITE UR QL STRIP: NEGATIVE
NRBC BLD-RTO: 0 /100 WBC
OPIATES UR QL SCN: NEGATIVE
PCP UR QL SCN>25 NG/ML: NEGATIVE
PH UR STRIP: 5 [PH] (ref 5–8)
PLATELET # BLD AUTO: 495 K/UL (ref 150–350)
PMV BLD AUTO: 10.9 FL (ref 9.2–12.9)
POCT GLUCOSE: 109 MG/DL (ref 70–110)
POTASSIUM SERPL-SCNC: 4 MMOL/L (ref 3.5–5.1)
PROCALCITONIN SERPL IA-MCNC: <0.02 NG/ML
PROT SERPL-MCNC: 8.9 G/DL (ref 6–8.4)
PROT UR QL STRIP: ABNORMAL
RBC # BLD AUTO: 5.78 M/UL (ref 4–5.4)
RBC #/AREA URNS HPF: 0 /HPF (ref 0–4)
SARS-COV-2 RDRP RESP QL NAA+PROBE: NEGATIVE
SODIUM SERPL-SCNC: 130 MMOL/L (ref 136–145)
SP GR UR STRIP: 1.02 (ref 1–1.03)
TOXICOLOGY INFORMATION: NORMAL
URN SPEC COLLECT METH UR: ABNORMAL
UROBILINOGEN UR STRIP-ACNC: ABNORMAL EU/DL
WBC # BLD AUTO: 18.43 K/UL (ref 3.9–12.7)
WBC #/AREA URNS HPF: 4 /HPF (ref 0–5)

## 2020-09-02 PROCEDURE — 81000 URINALYSIS NONAUTO W/SCOPE: CPT | Mod: 59

## 2020-09-02 PROCEDURE — 87040 BLOOD CULTURE FOR BACTERIA: CPT | Mod: 59

## 2020-09-02 PROCEDURE — 80053 COMPREHEN METABOLIC PANEL: CPT

## 2020-09-02 PROCEDURE — 63600175 PHARM REV CODE 636 W HCPCS: Performed by: PHYSICIAN ASSISTANT

## 2020-09-02 PROCEDURE — 99285 EMERGENCY DEPT VISIT HI MDM: CPT | Mod: 25

## 2020-09-02 PROCEDURE — U0002 COVID-19 LAB TEST NON-CDC: HCPCS

## 2020-09-02 PROCEDURE — 84145 PROCALCITONIN (PCT): CPT

## 2020-09-02 PROCEDURE — 25000003 PHARM REV CODE 250: Performed by: PHYSICIAN ASSISTANT

## 2020-09-02 PROCEDURE — 96361 HYDRATE IV INFUSION ADD-ON: CPT

## 2020-09-02 PROCEDURE — 96375 TX/PRO/DX INJ NEW DRUG ADDON: CPT

## 2020-09-02 PROCEDURE — 93010 EKG 12-LEAD: ICD-10-PCS | Mod: ,,, | Performed by: INTERNAL MEDICINE

## 2020-09-02 PROCEDURE — 25500020 PHARM REV CODE 255: Performed by: NURSE PRACTITIONER

## 2020-09-02 PROCEDURE — 96366 THER/PROPH/DIAG IV INF ADDON: CPT

## 2020-09-02 PROCEDURE — 96376 TX/PRO/DX INJ SAME DRUG ADON: CPT

## 2020-09-02 PROCEDURE — G0378 HOSPITAL OBSERVATION PER HR: HCPCS

## 2020-09-02 PROCEDURE — 83605 ASSAY OF LACTIC ACID: CPT

## 2020-09-02 PROCEDURE — 82550 ASSAY OF CK (CPK): CPT | Mod: 91

## 2020-09-02 PROCEDURE — 83690 ASSAY OF LIPASE: CPT

## 2020-09-02 PROCEDURE — 93010 ELECTROCARDIOGRAM REPORT: CPT | Mod: ,,, | Performed by: INTERNAL MEDICINE

## 2020-09-02 PROCEDURE — 96365 THER/PROPH/DIAG IV INF INIT: CPT

## 2020-09-02 PROCEDURE — 81025 URINE PREGNANCY TEST: CPT | Performed by: NURSE PRACTITIONER

## 2020-09-02 PROCEDURE — 85025 COMPLETE CBC W/AUTO DIFF WBC: CPT

## 2020-09-02 PROCEDURE — 80307 DRUG TEST PRSMV CHEM ANLYZR: CPT

## 2020-09-02 PROCEDURE — 25000003 PHARM REV CODE 250: Performed by: NURSE PRACTITIONER

## 2020-09-02 PROCEDURE — 93005 ELECTROCARDIOGRAM TRACING: CPT

## 2020-09-02 PROCEDURE — 82962 GLUCOSE BLOOD TEST: CPT

## 2020-09-02 PROCEDURE — 63600175 PHARM REV CODE 636 W HCPCS: Performed by: NURSE PRACTITIONER

## 2020-09-02 RX ORDER — GLUCAGON 1 MG
1 KIT INJECTION
Status: DISCONTINUED | OUTPATIENT
Start: 2020-09-02 | End: 2020-09-03 | Stop reason: HOSPADM

## 2020-09-02 RX ORDER — GABAPENTIN 400 MG/1
800 CAPSULE ORAL 2 TIMES DAILY
Status: DISCONTINUED | OUTPATIENT
Start: 2020-09-02 | End: 2020-09-03 | Stop reason: HOSPADM

## 2020-09-02 RX ORDER — AMOXICILLIN 250 MG
1 CAPSULE ORAL DAILY PRN
Status: DISCONTINUED | OUTPATIENT
Start: 2020-09-02 | End: 2020-09-03 | Stop reason: HOSPADM

## 2020-09-02 RX ORDER — IBUPROFEN 200 MG
16 TABLET ORAL
Status: DISCONTINUED | OUTPATIENT
Start: 2020-09-02 | End: 2020-09-03 | Stop reason: HOSPADM

## 2020-09-02 RX ORDER — ACETAMINOPHEN 500 MG
500 TABLET ORAL EVERY 6 HOURS PRN
Status: DISCONTINUED | OUTPATIENT
Start: 2020-09-02 | End: 2020-09-03

## 2020-09-02 RX ORDER — SODIUM CHLORIDE 0.9 % (FLUSH) 0.9 %
10 SYRINGE (ML) INJECTION
Status: DISCONTINUED | OUTPATIENT
Start: 2020-09-02 | End: 2020-09-03 | Stop reason: HOSPADM

## 2020-09-02 RX ORDER — GABAPENTIN 400 MG/1
800 CAPSULE ORAL 2 TIMES DAILY
Status: DISCONTINUED | OUTPATIENT
Start: 2020-09-03 | End: 2020-09-02

## 2020-09-02 RX ORDER — HYDRALAZINE HYDROCHLORIDE 20 MG/ML
10 INJECTION INTRAMUSCULAR; INTRAVENOUS EVERY 8 HOURS PRN
Status: DISCONTINUED | OUTPATIENT
Start: 2020-09-02 | End: 2020-09-03 | Stop reason: HOSPADM

## 2020-09-02 RX ORDER — MONTELUKAST SODIUM 10 MG/1
10 TABLET ORAL NIGHTLY
Status: DISCONTINUED | OUTPATIENT
Start: 2020-09-02 | End: 2020-09-03 | Stop reason: HOSPADM

## 2020-09-02 RX ORDER — IBUPROFEN 200 MG
24 TABLET ORAL
Status: DISCONTINUED | OUTPATIENT
Start: 2020-09-02 | End: 2020-09-03 | Stop reason: HOSPADM

## 2020-09-02 RX ORDER — LABETALOL 100 MG/1
300 TABLET, FILM COATED ORAL EVERY 12 HOURS
Status: DISCONTINUED | OUTPATIENT
Start: 2020-09-02 | End: 2020-09-03 | Stop reason: HOSPADM

## 2020-09-02 RX ORDER — ONDANSETRON 2 MG/ML
8 INJECTION INTRAMUSCULAR; INTRAVENOUS
Status: COMPLETED | OUTPATIENT
Start: 2020-09-02 | End: 2020-09-02

## 2020-09-02 RX ORDER — SODIUM CHLORIDE 9 MG/ML
INJECTION, SOLUTION INTRAVENOUS CONTINUOUS
Status: DISCONTINUED | OUTPATIENT
Start: 2020-09-02 | End: 2020-09-03

## 2020-09-02 RX ORDER — ONDANSETRON 2 MG/ML
4 INJECTION INTRAMUSCULAR; INTRAVENOUS EVERY 6 HOURS PRN
Status: DISCONTINUED | OUTPATIENT
Start: 2020-09-02 | End: 2020-09-03 | Stop reason: HOSPADM

## 2020-09-02 RX ORDER — SODIUM CHLORIDE 9 MG/ML
INJECTION, SOLUTION INTRAVENOUS CONTINUOUS
Status: DISCONTINUED | OUTPATIENT
Start: 2020-09-02 | End: 2020-09-02

## 2020-09-02 RX ORDER — TOPIRAMATE 25 MG/1
50 TABLET ORAL 2 TIMES DAILY
Status: DISCONTINUED | OUTPATIENT
Start: 2020-09-02 | End: 2020-09-03 | Stop reason: HOSPADM

## 2020-09-02 RX ADMIN — SODIUM CHLORIDE 2000 ML: 0.9 INJECTION, SOLUTION INTRAVENOUS at 12:09

## 2020-09-02 RX ADMIN — MONTELUKAST 10 MG: 10 TABLET, FILM COATED ORAL at 08:09

## 2020-09-02 RX ADMIN — HYDRALAZINE HYDROCHLORIDE 10 MG: 20 INJECTION INTRAMUSCULAR; INTRAVENOUS at 05:09

## 2020-09-02 RX ADMIN — ACETAMINOPHEN 500 MG: 500 TABLET ORAL at 08:09

## 2020-09-02 RX ADMIN — TOPIRAMATE 50 MG: 25 TABLET, FILM COATED ORAL at 08:09

## 2020-09-02 RX ADMIN — PROMETHAZINE HYDROCHLORIDE 25 MG: 25 INJECTION INTRAMUSCULAR; INTRAVENOUS at 02:09

## 2020-09-02 RX ADMIN — GABAPENTIN 800 MG: 400 CAPSULE ORAL at 08:09

## 2020-09-02 RX ADMIN — ONDANSETRON 4 MG: 2 INJECTION INTRAMUSCULAR; INTRAVENOUS at 09:09

## 2020-09-02 RX ADMIN — ONDANSETRON 8 MG: 2 INJECTION INTRAMUSCULAR; INTRAVENOUS at 01:09

## 2020-09-02 RX ADMIN — IOHEXOL 100 ML: 350 INJECTION, SOLUTION INTRAVENOUS at 02:09

## 2020-09-02 RX ADMIN — SODIUM CHLORIDE 1000 ML: 0.9 INJECTION, SOLUTION INTRAVENOUS at 02:09

## 2020-09-02 RX ADMIN — SODIUM CHLORIDE: 0.9 INJECTION, SOLUTION INTRAVENOUS at 05:09

## 2020-09-02 RX ADMIN — LABETALOL HYDROCHLORIDE 300 MG: 100 TABLET, FILM COATED ORAL at 08:09

## 2020-09-02 NOTE — SUBJECTIVE & OBJECTIVE
Past Medical History:   Diagnosis Date    Bronchitis     Hypertension     Migraine headache     Migraines        Past Surgical History:   Procedure Laterality Date     SECTION      GALLBLADDER SURGERY  2017    TUBAL LIGATION         Review of patient's allergies indicates:  No Known Allergies    No current facility-administered medications on file prior to encounter.      Current Outpatient Medications on File Prior to Encounter   Medication Sig    albuterol (PROVENTIL) 5 mg/mL nebulizer solution Take 2.5 mg by nebulization every 6 (six) hours as needed for Wheezing. Rescue    BUTALB/ACETAMINOPHEN/CAFFEINE (FIORICET ORAL) Take by mouth.    ferrous sulfate 324 mg (65 mg iron) TbEC Take 325 mg by mouth once daily.    fluticasone (FLONASE) 50 mcg/actuation nasal spray 1 spray by Each Nare route once daily.    ibuprofen (ADVIL,MOTRIN) 600 MG tablet Take 1 tablet (600 mg total) by mouth every 6 (six) hours as needed for Pain.    labetalol (NORMODYNE) 200 MG tablet Take 1 tablet (200 mg total) by mouth every 12 (twelve) hours. (Patient taking differently: Take 300 mg by mouth every 12 (twelve) hours. )    montelukast (SINGULAIR) 10 mg tablet Take 10 mg by mouth every evening.    ondansetron (ZOFRAN) 4 MG tablet Take 1 tablet (4 mg total) by mouth every 6 (six) hours as needed.    oxycodone-acetaminophen (PERCOCET) 5-325 mg per tablet Take 1 tablet by mouth every 4 (four) hours as needed for Pain.    topiramate (TOPAMAX) 50 MG tablet Take 1 tablet (50 mg total) by mouth 2 (two) times daily. for 10 days    TRAMADOL HCL (ULTRAM ORAL) Take by mouth.     Family History     Problem Relation (Age of Onset)    Asthma Mother    Heart disease Father        Tobacco Use    Smoking status: Former Smoker     Years: 10.00     Quit date: 3/1/2008     Years since quittin.5    Smokeless tobacco: Never Used   Substance and Sexual Activity    Alcohol use: Yes     Alcohol/week: 2.0 standard drinks      Types: 2 Cans of beer per week     Comment: ocassionally    Drug use: No    Sexual activity: Yes     Partners: Male     Review of Systems   Constitutional: Negative for chills and fever.   HENT: Negative for nosebleeds and tinnitus.    Eyes: Negative for photophobia and visual disturbance.   Respiratory: Negative for shortness of breath and wheezing.    Cardiovascular: Negative for chest pain, palpitations and leg swelling.   Gastrointestinal: Positive for abdominal pain, nausea and vomiting. Negative for abdominal distention.   Genitourinary: Negative for dysuria, flank pain and hematuria.   Musculoskeletal: Negative for gait problem and joint swelling.   Skin: Negative for rash and wound.   Neurological: Negative for seizures and syncope.     Objective:     Vital Signs (Most Recent):  Temp: 98.6 °F (37 °C) (09/02/20 1125)  Pulse: 92 (09/02/20 1413)  Resp: 20 (09/02/20 1137)  BP: (!) 175/105 (09/02/20 1327)  SpO2: 99 % (09/02/20 1413) Vital Signs (24h Range):  Temp:  [98.6 °F (37 °C)] 98.6 °F (37 °C)  Pulse:  [] 92  Resp:  [18-20] 20  SpO2:  [94 %-99 %] 99 %  BP: (146-186)/() 175/105     Weight: 111.1 kg (245 lb)  Body mass index is 36.18 kg/m².    Physical Exam  Vitals signs and nursing note reviewed.   Constitutional:       General: She is not in acute distress.     Appearance: She is well-developed.   HENT:      Head: Normocephalic and atraumatic.      Right Ear: External ear normal.      Left Ear: External ear normal.      Mouth/Throat:      Comments: Dry mucous membranes. Piercing in tongue  Eyes:      General:         Right eye: No discharge.         Left eye: No discharge.      Conjunctiva/sclera: Conjunctivae normal.   Neck:      Musculoskeletal: Normal range of motion.      Thyroid: No thyromegaly.   Cardiovascular:      Rate and Rhythm: Normal rate and regular rhythm.      Heart sounds: No murmur.   Pulmonary:      Effort: Pulmonary effort is normal. No respiratory distress.      Breath  sounds: Normal breath sounds.   Abdominal:      General: Bowel sounds are normal. There is no distension.      Palpations: Abdomen is soft. There is no mass.      Tenderness: There is abdominal tenderness (mild epigastric tenderness).   Musculoskeletal:         General: No deformity.   Skin:     General: Skin is warm and dry.   Neurological:      Mental Status: She is alert and oriented to person, place, and time.   Psychiatric:         Behavior: Behavior normal.             Significant Labs:   CBC:   Recent Labs   Lab 09/02/20  1206   WBC 18.43*   HGB 16.5*   HCT 48.6*   *     CMP:   Recent Labs   Lab 09/02/20  1206   *   K 4.0   CL 94*   CO2 23      BUN 23*   CREATININE 1.2   CALCIUM 10.7*   PROT 8.9*   ALBUMIN 5.1   BILITOT 2.3*   ALKPHOS 166*   AST 19   ALT 32   ANIONGAP 13   EGFRNONAA 56*     Lactic Acid:   Recent Labs   Lab 09/02/20  1206   LACTATE 1.6     Urine Studies:   Recent Labs   Lab 09/02/20  1318   COLORU Nusrat   APPEARANCEUA Clear   PHUR 5.0   SPECGRAV 1.020   PROTEINUA 1+*   GLUCUA Negative   KETONESU 1+*   BILIRUBINUA Negative   OCCULTUA Negative   NITRITE Negative   UROBILINOGEN 2.0-3.0*   LEUKOCYTESUR Negative   RBCUA 0   WBCUA 4   BACTERIA Few*   HYALINECASTS 10*       Significant Imaging:   Imaging Results          CT Abdomen Pelvis With Contrast (Final result)  Result time 09/02/20 14:59:09    Final result by Gatito Phillip MD (09/02/20 14:59:09)                 Impression:      No acute findings in the abdomen or pelvis.    Sigmoid colon diverticulosis without evidence of diverticulitis.    Cholecystectomy.      Electronically signed by: Gatito Phillip MD  Date:    09/02/2020  Time:    14:59             Narrative:    EXAMINATION:  CT ABDOMEN PELVIS WITH CONTRAST    CLINICAL HISTORY:  Abdominal pain, acute, nonlocalized;    TECHNIQUE:  Low dose axial images, sagittal and coronal reformations were obtained from the lung bases to the pubic symphysis following the IV  "administration of 100 mL of Omnipaque 350 .  Oral contrast was not given.    COMPARISON:  Abdominal ultrasound, 08/10/2017.    FINDINGS:  Lower Chest:    Lung bases are clear.  Heart size is normal.    Abdomen:    Liver is normal in contour.  Small amount of focal fatty infiltration noted adjacent to the falciform ligament.  No suspicious hepatic mass.  Gallbladder is surgically absent.  No intrahepatic biliary ductal dilatation.    Spleen, adrenals, and pancreas are unremarkable.    The kidneys are symmetric.  No hydronephrosis. No asymmetric perinephric fat stranding.    No small bowel obstruction.  The appendix is normal.  There are several diverticula involving the sigmoid colon without findings of diverticulitis.    No pneumoperitoneum or organized fluid collection.    No bulky lymphadenopathy.    Abdominal aorta is normal in caliber.    Portal, splenic, and superior mesenteric veins are patent.    Pelvis:    Urinary bladder, pelvic organs, and rectum are unremarkable.  No free fluid in the pelvis.  No pelvic lymphadenopathy.    Bones and soft tissues:    No aggressive osseous lesions.  Extraperitoneal soft tissues are negative for acute finding.  Linear band of soft tissue density noted in the left gluteal subcutaneous tissues, possibly scarring.                               X-Ray Chest AP Portable (Final result)  Result time 09/02/20 12:20:04    Final result by Gatito Phillip MD (09/02/20 12:20:04)                 Impression:      No acute cardiopulmonary finding identified on this single view.      Electronically signed by: Gatito Phillip MD  Date:    09/02/2020  Time:    12:20             Narrative:    EXAMINATION:  XR CHEST AP PORTABLE    CLINICAL HISTORY:  Provided history is "  Tachycardia, unspecified".    TECHNIQUE:  One view of the chest.    COMPARISON:  None.    FINDINGS:  Cardiac silhouette is not enlarged.  No focal consolidation.  No sizable pleural effusion.  No pneumothorax.                "

## 2020-09-02 NOTE — ASSESSMENT & PLAN NOTE
With increasing N/V today and inability to tolerate PO at home. WBC of 18.41 with H&H of 16.5&48.6. negative procal and CT without acute findings. Per chart review hospitalized in 1/2020 with similar presentation and THC positive. Denies THC use today  -started on IVF  -NPO  -UDS added on  -antiemetics  -US abdomen ordered as elevated bilirubin s/p cholecystectomy

## 2020-09-02 NOTE — ASSESSMENT & PLAN NOTE
Poorly controlled. Continue home labetalol. Holding home lisinopril as Bun/Cr elevated above baseline. PRN hydralazine

## 2020-09-02 NOTE — ED PROVIDER NOTES
"Encounter Date: 9/2/2020    SCRIBE #1 NOTE: I, Sesar Milner, am scribing for, and in the presence of,  Reid Dewey NP. I have scribed the following portions of the note - Other sections scribed: HPI, ROS, PE.       History     Chief Complaint   Patient presents with    Abdominal Pain     Pt c/o abdominal pain, N/V that started yesterday. Pt reports she was seen in this ED Thursday for same complaint, states they ran tests but could find nothing wrong. Pain is 6/10    Emesis     CC: Emesis    HPI: This is a 40 y.o. F who has HTN, and Migraine headaches who presents to the ED complaining of acute vomiting that began upon waking yesterday morning. Pt has associated nausea and abdominal pain. She attributes the abdominal pain to vomiting. She states that she has been experiencing ongoing intermittent vomiting with associated abdominal pain and nausea since having a Cholecystectomy secondary to gall stones 3 years ago. She denies Hx of pancreatitis. Pt reports that she was evaluated and treated in the ED for previous episode of similar problem 7 months ago and was admitted for dehydration and a "blood infection" at that time. She reports that the most recent previous episode of similar problem was 6 days ago, which she was evaluated and treated at this ED. She was administered IV fluids, Zofran, and potassium during that visit, in which symptoms improved and patient was discharged home with prescribed Zofran and potassium. She has attempted taking Zofran for current nausea and vomiting without relief. Pt has an additional complaint of acute left frontal headache that began last night. She attributes the headache to not eating secondary to nausea. It is similar to past headaches. She denies that this is the worse headache that she has experienced in her life. She states that she was prescribed Topamax at her previous ED visit 6 days ago. Pt reports an episode of lightheadedness while at work 2 days ago, which she " was sent home from work. The lightheadedness persisted while waiting at the bus stop, in which she reports a syncopal episode at that time. She states that the lightheadedness has been intermittent and ongoing for the past week. Pt denies fever, or ill contact.      The history is provided by the patient. No  was used.     Review of patient's allergies indicates:  No Known Allergies  Past Medical History:   Diagnosis Date    Bronchitis     Hypertension     Migraine headache     Migraines      Past Surgical History:   Procedure Laterality Date     SECTION      GALLBLADDER SURGERY  2017    TUBAL LIGATION       Family History   Problem Relation Age of Onset    Asthma Mother     Heart disease Father      Social History     Tobacco Use    Smoking status: Former Smoker     Years: 10.00     Quit date: 3/1/2008     Years since quittin.5    Smokeless tobacco: Never Used   Substance Use Topics    Alcohol use: Yes     Alcohol/week: 2.0 standard drinks     Types: 2 Cans of beer per week     Comment: ocassionally    Drug use: No     Review of Systems   Constitutional: Negative for chills and fever.   HENT: Negative for sore throat.    Respiratory: Negative for cough and shortness of breath.    Cardiovascular: Negative for chest pain.   Gastrointestinal: Positive for abdominal pain, nausea and vomiting. Negative for diarrhea.   Genitourinary: Negative for dysuria.   Musculoskeletal: Negative for back pain.   Skin: Negative for rash.   Neurological: Positive for dizziness, light-headedness and headaches. Negative for syncope and weakness.   Hematological: Does not bruise/bleed easily.       Physical Exam     Initial Vitals [20 1125]   BP Pulse Resp Temp SpO2   (!) 186/113 (!) 128 18 98.6 °F (37 °C) 97 %      MAP       --         Physical Exam    Nursing note and vitals reviewed.  Constitutional: She appears well-developed and well-nourished. She is not diaphoretic. No  distress.   HENT:   Head: Normocephalic and atraumatic.   Right Ear: External ear normal.   Left Ear: External ear normal.   Nose: Nose normal.   Eyes: Conjunctivae and EOM are normal. Pupils are equal, round, and reactive to light.   Neck: Normal range of motion. Neck supple.   Cardiovascular: Normal rate, regular rhythm and normal heart sounds.   Pulmonary/Chest: Breath sounds normal. No respiratory distress.   Abdominal: Soft. Bowel sounds are normal. There is abdominal tenderness (mild) in the right upper quadrant and epigastric area.   Musculoskeletal: Normal range of motion. No edema.   Neurological: She is alert and oriented to person, place, and time. No cranial nerve deficit.   Skin: Skin is warm and dry. No rash noted. No pallor.   Psychiatric: She has a normal mood and affect.         ED Course   Procedures  Labs Reviewed   CBC W/ AUTO DIFFERENTIAL - Abnormal; Notable for the following components:       Result Value    WBC 18.43 (*)     RBC 5.78 (*)     Hemoglobin 16.5 (*)     Hematocrit 48.6 (*)     Platelets 495 (*)     Gran # (ANC) 15.0 (*)     Immature Grans (Abs) 0.06 (*)     Gran% 81.3 (*)     Lymph% 12.1 (*)     All other components within normal limits   COMPREHENSIVE METABOLIC PANEL - Abnormal; Notable for the following components:    Sodium 130 (*)     Chloride 94 (*)     BUN, Bld 23 (*)     Calcium 10.7 (*)     Total Protein 8.9 (*)     Total Bilirubin 2.3 (*)     Alkaline Phosphatase 166 (*)     eGFR if non  56 (*)     All other components within normal limits   URINALYSIS, REFLEX TO URINE CULTURE - Abnormal; Notable for the following components:    Protein, UA 1+ (*)     Ketones, UA 1+ (*)     Urobilinogen, UA 2.0-3.0 (*)     All other components within normal limits    Narrative:     Specimen Source->Urine   URINALYSIS MICROSCOPIC - Abnormal; Notable for the following components:    Bacteria Few (*)     Hyaline Casts, UA 10 (*)     All other components within normal limits     Narrative:     Specimen Source->Urine   CULTURE, BLOOD   CULTURE, BLOOD   LIPASE   LACTIC ACID, PLASMA   PROCALCITONIN   CK   CK   SARS-COV-2 RNA AMPLIFICATION, QUAL   POCT URINE PREGNANCY   POCT GLUCOSE     EKG Readings: (Independently Interpreted)   Initial Reading: No STEMI. Rhythm: Sinus Tachycardia. Heart Rate: 109. Ectopy: No Ectopy. Conduction: Normal. ST Segments: Normal ST Segments. T Waves: Normal. Clinical Impression: Sinus Tachycardia Other Impression: No evidence of ischemia, ectopy, or malignant arrhythmia     ECG Results          EKG 12-lead (In process)  Result time 09/02/20 15:42:26    In process by Interface, Lab In [facility] (09/02/20 15:42:26)                 Narrative:    Test Reason : R00.0,    Vent. Rate : 109 BPM     Atrial Rate : 109 BPM     P-R Int : 146 ms          QRS Dur : 080 ms      QT Int : 324 ms       P-R-T Axes : 048 035 021 degrees     QTc Int : 436 ms    Sinus tachycardia  Otherwise normal ECG  No previous ECGs available    Referred By: [email or web address]   SELF           Confirmed By:                   In process by Interface, Lab In [facility] (09/02/20 15:40:47)                 Narrative:    Test Reason : R00.0,    Vent. Rate : 109 BPM     Atrial Rate : 109 BPM     P-R Int : 146 ms          QRS Dur : 080 ms      QT Int : 324 ms       P-R-T Axes : 048 035 021 degrees     QTc Int : 436 ms    Sinus tachycardia  Otherwise normal ECG  No previous ECGs available    Referred By: [email or web address]   SELF           Confirmed By:                             Imaging Results          CT Abdomen Pelvis With Contrast (Final result)  Result time 09/02/20 14:59:09    Final result by Gatito Phillip MD (09/02/20 14:59:09)                 Impression:      No acute findings in the abdomen or pelvis.    Sigmoid colon diverticulosis without evidence of diverticulitis.    Cholecystectomy.      Electronically signed by: Gatito Phillip MD  Date:    09/02/2020  Time:    14:59             Narrative:    EXAMINATION:  CT  ABDOMEN PELVIS WITH CONTRAST    CLINICAL HISTORY:  Abdominal pain, acute, nonlocalized;    TECHNIQUE:  Low dose axial images, sagittal and coronal reformations were obtained from the lung bases to the pubic symphysis following the IV administration of 100 mL of Omnipaque 350 .  Oral contrast was not given.    COMPARISON:  Abdominal ultrasound, 08/10/2017.    FINDINGS:  Lower Chest:    Lung bases are clear.  Heart size is normal.    Abdomen:    Liver is normal in contour.  Small amount of focal fatty infiltration noted adjacent to the falciform ligament.  No suspicious hepatic mass.  Gallbladder is surgically absent.  No intrahepatic biliary ductal dilatation.    Spleen, adrenals, and pancreas are unremarkable.    The kidneys are symmetric.  No hydronephrosis. No asymmetric perinephric fat stranding.    No small bowel obstruction.  The appendix is normal.  There are several diverticula involving the sigmoid colon without findings of diverticulitis.    No pneumoperitoneum or organized fluid collection.    No bulky lymphadenopathy.    Abdominal aorta is normal in caliber.    Portal, splenic, and superior mesenteric veins are patent.    Pelvis:    Urinary bladder, pelvic organs, and rectum are unremarkable.  No free fluid in the pelvis.  No pelvic lymphadenopathy.    Bones and soft tissues:    No aggressive osseous lesions.  Extraperitoneal soft tissues are negative for acute finding.  Linear band of soft tissue density noted in the left gluteal subcutaneous tissues, possibly scarring.                               X-Ray Chest AP Portable (Final result)  Result time 09/02/20 12:20:04    Final result by Gatito Phillip MD (09/02/20 12:20:04)                 Impression:      No acute cardiopulmonary finding identified on this single view.      Electronically signed by: Gatito Phillip MD  Date:    09/02/2020  Time:    12:20             Narrative:    EXAMINATION:  XR CHEST AP PORTABLE    CLINICAL HISTORY:  Provided  "history is "  Tachycardia, unspecified".    TECHNIQUE:  One view of the chest.    COMPARISON:  None.    FINDINGS:  Cardiac silhouette is not enlarged.  No focal consolidation.  No sizable pleural effusion.  No pneumothorax.                                 Medical Decision Making:   History:   Old Medical Records: I decided to obtain old medical records.  Differential Diagnosis:   Pancreatitis, bowel obstruction, bowel perforation, sepsis, PUD, GERD, dehydration, electrolyte abnormality, appendicitis, diverticulitis, colitis, others  Clinical Tests:   Lab Tests: Ordered and Reviewed  Radiological Study: Ordered and Reviewed  Medical Tests: Ordered and Reviewed  ED Management:  HPI and physical exam as above.    Patient initially tachycardic but afebrile and not hypotensive.  Physical exam with upper abdominal tenderness to palpation without rigidity or guarding.  No rebound tenderness.  Lower abdomen is nontender.  Given IV fluids and Zofran for initial treatment.  Lactic acid and procalcitonin are negative.  Labs with leukocytosis and increased H&H, possibly due to serum concentration secondary to fluid volume loss.  T bili and BUN are elevated and there is hyponatremia and hypochloremia.  Urinalysis without evidence of infection or hematuria.  Blood cultures are in process, however sepsis is unlikely.  CT and chest x-ray show no evidence of concerning acute abnormality to explain the patient's symptoms.  Following treatment with Zofran patient still complains of nausea.  Patient treated with Phenergan after which she still reports nausea.  Case discussed with CLYDE Nelson.  Patient will be placed in observation for IV fluids and management of intractable nausea.                                 Clinical Impression:       ICD-10-CM ICD-9-CM   1. Dehydration  E86.0 276.51   2. Tachycardia  R00.0 785.0   3. Epigastric pain  R10.13 789.06   4. Intractable vomiting with nausea, unspecified vomiting type  R11.2 " 536.2   5. Hyponatremia  E87.1 276.1   6. Hypochloremia  E87.8 276.9   7. Serum total bilirubin elevated  R17 277.4         Disposition:   Disposition: Placed in Observation  Condition: Stable     ED Disposition Condition    Observation              I, Reid Dewey NP, personally performed the services described in this documentation. All medical record entries made by the scribe were at my direction and in my presence. I have reviewed the chart and agree that the record reflects my personal performance and is accurate and complete.             Reid Dewey NP  09/02/20 1402

## 2020-09-02 NOTE — H&P
[facility] Hospital Medicine  History & Physical    Patient Name: Shady Lopez  MRN: [id number]  Admission Date: 9/2/2020  Attending Physician: Irene Silva MD   Primary Care Provider: Mc Minaya NP         Patient information was obtained from patient, past medical records and ER records.     Subjective:     Principal Problem:Dehydration    Chief Complaint:   Chief Complaint   Patient presents with    Abdominal Pain     Pt c/o abdominal pain, N/V that started yesterday. Pt reports she was seen in this ED Thursday for same complaint, states they ran tests but could find nothing wrong. Pain is 6/10    Emesis        HPI: Shady Lopez 40 y.o. female with HTN, migraines, and s/p cholecystectomy presents to the hospital with a chief complaint of N/V. She reports 6 days ago she was seen in the ED for N/V and discharged. Her symptoms improved and she returned to baseline until yesterday. Since that time she has had profuse N/V associated with an epigastric abdominal pain that does not radiate and is worsened with palpation of her chest. She finds it improves if laying still. She denies any sick contacts or eating of undercooked foods. She reports a previous hospitalization for similar symptoms though she is unsure of the cause. She is s/p cholecystectomy. She denies fever, chest pain, SOB, leg swelling, syncope, hematemesis, melena, hematuria. She does find when she stands she sometimes feels dizzy, but this improved with laying flat.     In the ED, WBC of 18.41, H&& of 16.5&48.6, procal negative, BUN of 23 and Cr of 1.2, covid negative, lactic acid 1.6, CPK 35, UA with rare bacteria negative for leukocytes or nitrites.     Hospitalized in 1/2020 with UDS positive for THC and WBC of 26 with H&H of 18.5&51.8 with BUN/CR of 47/3.9.     Past Medical History:   Diagnosis Date    Bronchitis     Hypertension     Migraine headache     Migraines        Past Surgical History:   Procedure  Laterality Date     SECTION      GALLBLADDER SURGERY  2017    TUBAL LIGATION         Review of patient's allergies indicates:  No Known Allergies    No current facility-administered medications on file prior to encounter.      Current Outpatient Medications on File Prior to Encounter   Medication Sig    albuterol (PROVENTIL) 5 mg/mL nebulizer solution Take 2.5 mg by nebulization every 6 (six) hours as needed for Wheezing. Rescue    BUTALB/ACETAMINOPHEN/CAFFEINE (FIORICET ORAL) Take by mouth.    ferrous sulfate 324 mg (65 mg iron) TbEC Take 325 mg by mouth once daily.    fluticasone (FLONASE) 50 mcg/actuation nasal spray 1 spray by Each Nare route once daily.    ibuprofen (ADVIL,MOTRIN) 600 MG tablet Take 1 tablet (600 mg total) by mouth every 6 (six) hours as needed for Pain.    labetalol (NORMODYNE) 200 MG tablet Take 1 tablet (200 mg total) by mouth every 12 (twelve) hours. (Patient taking differently: Take 300 mg by mouth every 12 (twelve) hours. )    montelukast (SINGULAIR) 10 mg tablet Take 10 mg by mouth every evening.    ondansetron (ZOFRAN) 4 MG tablet Take 1 tablet (4 mg total) by mouth every 6 (six) hours as needed.    oxycodone-acetaminophen (PERCOCET) 5-325 mg per tablet Take 1 tablet by mouth every 4 (four) hours as needed for Pain.    topiramate (TOPAMAX) 50 MG tablet Take 1 tablet (50 mg total) by mouth 2 (two) times daily. for 10 days    TRAMADOL HCL (ULTRAM ORAL) Take by mouth.     Family History     Problem Relation (Age of Onset)    Asthma Mother    Heart disease Father        Tobacco Use    Smoking status: Former Smoker     Years: 10.00     Quit date: 3/1/2008     Years since quittin.5    Smokeless tobacco: Never Used   Substance and Sexual Activity    Alcohol use: Yes     Alcohol/week: 2.0 standard drinks     Types: 2 Cans of beer per week     Comment: ocassionally    Drug use: No    Sexual activity: Yes     Partners: Male     Review of Systems    Constitutional: Negative for chills and fever.   HENT: Negative for nosebleeds and tinnitus.    Eyes: Negative for photophobia and visual disturbance.   Respiratory: Negative for shortness of breath and wheezing.    Cardiovascular: Negative for chest pain, palpitations and leg swelling.   Gastrointestinal: Positive for abdominal pain, nausea and vomiting. Negative for abdominal distention.   Genitourinary: Negative for dysuria, flank pain and hematuria.   Musculoskeletal: Negative for gait problem and joint swelling.   Skin: Negative for rash and wound.   Neurological: Negative for seizures and syncope.     Objective:     Vital Signs (Most Recent):  Temp: 98.6 °F (37 °C) (09/02/20 1125)  Pulse: 92 (09/02/20 1413)  Resp: 20 (09/02/20 1137)  BP: (!) 175/105 (09/02/20 1327)  SpO2: 99 % (09/02/20 1413) Vital Signs (24h Range):  Temp:  [98.6 °F (37 °C)] 98.6 °F (37 °C)  Pulse:  [] 92  Resp:  [18-20] 20  SpO2:  [94 %-99 %] 99 %  BP: (146-186)/() 175/105     Weight: 111.1 kg (245 lb)  Body mass index is 36.18 kg/m².    Physical Exam  Vitals signs and nursing note reviewed.   Constitutional:       General: She is not in acute distress.     Appearance: She is well-developed.   HENT:      Head: Normocephalic and atraumatic.      Right Ear: External ear normal.      Left Ear: External ear normal.      Mouth/Throat:      Comments: Dry mucous membranes. Piercing in tongue  Eyes:      General:         Right eye: No discharge.         Left eye: No discharge.      Conjunctiva/sclera: Conjunctivae normal.   Neck:      Musculoskeletal: Normal range of motion.      Thyroid: No thyromegaly.   Cardiovascular:      Rate and Rhythm: Normal rate and regular rhythm.      Heart sounds: No murmur.   Pulmonary:      Effort: Pulmonary effort is normal. No respiratory distress.      Breath sounds: Normal breath sounds.   Abdominal:      General: Bowel sounds are normal. There is no distension.      Palpations: Abdomen is soft.  There is no mass.      Tenderness: There is abdominal tenderness (mild epigastric tenderness).   Musculoskeletal:         General: No deformity.   Skin:     General: Skin is warm and dry.   Neurological:      Mental Status: She is alert and oriented to person, place, and time.   Psychiatric:         Behavior: Behavior normal.             Significant Labs:   CBC:   Recent Labs   Lab 09/02/20  1206   WBC 18.43*   HGB 16.5*   HCT 48.6*   *     CMP:   Recent Labs   Lab 09/02/20  1206   *   K 4.0   CL 94*   CO2 23      BUN 23*   CREATININE 1.2   CALCIUM 10.7*   PROT 8.9*   ALBUMIN 5.1   BILITOT 2.3*   ALKPHOS 166*   AST 19   ALT 32   ANIONGAP 13   EGFRNONAA 56*     Lactic Acid:   Recent Labs   Lab 09/02/20  1206   LACTATE 1.6     Urine Studies:   Recent Labs   Lab 09/02/20  1318   COLORU Nusrat   APPEARANCEUA Clear   PHUR 5.0   SPECGRAV 1.020   PROTEINUA 1+*   GLUCUA Negative   KETONESU 1+*   BILIRUBINUA Negative   OCCULTUA Negative   NITRITE Negative   UROBILINOGEN 2.0-3.0*   LEUKOCYTESUR Negative   RBCUA 0   WBCUA 4   BACTERIA Few*   HYALINECASTS 10*       Significant Imaging:   Imaging Results          CT Abdomen Pelvis With Contrast (Final result)  Result time 09/02/20 14:59:09    Final result by Gatito Phillip MD (09/02/20 14:59:09)                 Impression:      No acute findings in the abdomen or pelvis.    Sigmoid colon diverticulosis without evidence of diverticulitis.    Cholecystectomy.      Electronically signed by: Gatito Phillip MD  Date:    09/02/2020  Time:    14:59             Narrative:    EXAMINATION:  CT ABDOMEN PELVIS WITH CONTRAST    CLINICAL HISTORY:  Abdominal pain, acute, nonlocalized;    TECHNIQUE:  Low dose axial images, sagittal and coronal reformations were obtained from the lung bases to the pubic symphysis following the IV administration of 100 mL of Omnipaque 350 .  Oral contrast was not given.    COMPARISON:  Abdominal ultrasound, 08/10/2017.    FINDINGS:  Lower  "Chest:    Lung bases are clear.  Heart size is normal.    Abdomen:    Liver is normal in contour.  Small amount of focal fatty infiltration noted adjacent to the falciform ligament.  No suspicious hepatic mass.  Gallbladder is surgically absent.  No intrahepatic biliary ductal dilatation.    Spleen, adrenals, and pancreas are unremarkable.    The kidneys are symmetric.  No hydronephrosis. No asymmetric perinephric fat stranding.    No small bowel obstruction.  The appendix is normal.  There are several diverticula involving the sigmoid colon without findings of diverticulitis.    No pneumoperitoneum or organized fluid collection.    No bulky lymphadenopathy.    Abdominal aorta is normal in caliber.    Portal, splenic, and superior mesenteric veins are patent.    Pelvis:    Urinary bladder, pelvic organs, and rectum are unremarkable.  No free fluid in the pelvis.  No pelvic lymphadenopathy.    Bones and soft tissues:    No aggressive osseous lesions.  Extraperitoneal soft tissues are negative for acute finding.  Linear band of soft tissue density noted in the left gluteal subcutaneous tissues, possibly scarring.                               X-Ray Chest AP Portable (Final result)  Result time 09/02/20 12:20:04    Final result by Gatito Phillip MD (09/02/20 12:20:04)                 Impression:      No acute cardiopulmonary finding identified on this single view.      Electronically signed by: Gatito Phillip MD  Date:    09/02/2020  Time:    12:20             Narrative:    EXAMINATION:  XR CHEST AP PORTABLE    CLINICAL HISTORY:  Provided history is "  Tachycardia, unspecified".    TECHNIQUE:  One view of the chest.    COMPARISON:  None.    FINDINGS:  Cardiac silhouette is not enlarged.  No focal consolidation.  No sizable pleural effusion.  No pneumothorax.                                  Assessment/Plan:     * Dehydration  With increasing N/V today and inability to tolerate PO at home. WBC of 18.41 with H&H of " 16.5&48.6. negative procal and CT without acute findings. Per chart review hospitalized in 1/2020 with similar presentation and THC positive. Denies THC use today  -started on IVF  -NPO  -UDS added on  -antiemetics  -US abdomen ordered as elevated bilirubin s/p cholecystectomy    Migraines  Continue home topamax and gabapentin      Essential hypertension  Poorly controlled. Continue home labetalol. Holding home lisinopril as Bun/Cr elevated above baseline. PRN hydralazine    Leukocytosis  WBC of 18 on arrival with negative procal and afebrile. Suspect related to hemoconcentration as dry mucosa on exam, H&H of 16.5&48.6. Started on IVF  Blood cultures pending    VTE Risk Mitigation (From admission, onward)         Ordered     IP VTE HIGH RISK PATIENT  Once      09/02/20 1609     Place sequential compression device  Until discontinued      09/02/20 1609     Place PONCHO hose  Until discontinued      09/02/20 1609              VTE: PONCHO/SCD  Code: Full  Diet: NPO  Dispo: pending improvement in labs and tolerating PO  Patient will be placed in observation with hospital medicine.     Chas Chavis PA-C  Department of Hospital Medicine   [facility]

## 2020-09-02 NOTE — ED NOTES
Pt aware of need for urine sample, informed to notify nurse when able to provide sample. Call light within reach.

## 2020-09-02 NOTE — HPI
Shady Lopez 40 y.o. female with HTN, migraines, and s/p cholecystectomy presents to the hospital with a chief complaint of N/V. She reports 6 days ago she was seen in the ED for N/V and discharged. Her symptoms improved and she returned to baseline until yesterday. Since that time she has had profuse N/V associated with an epigastric abdominal pain that does not radiate and is worsened with palpation of her chest. She finds it improves if laying still. She denies any sick contacts or eating of undercooked foods. She reports a previous hospitalization for similar symptoms though she is unsure of the cause. She is s/p cholecystectomy. She denies fever, chest pain, SOB, leg swelling, syncope, hematemesis, melena, hematuria. She does find when she stands she sometimes feels dizzy, but this improved with laying flat.     In the ED, WBC of 18.41, H&& of 16.5&48.6, procal negative, BUN of 23 and Cr of 1.2, covid negative, lactic acid 1.6, CPK 35, UA with rare bacteria negative for leukocytes or nitrites.     Hospitalized in 1/2020 with UDS positive for THC and WBC of 26 with H&H of 18.5&51.8 with BUN/CR of 47/3.9.

## 2020-09-02 NOTE — ASSESSMENT & PLAN NOTE
WBC of 18 on arrival with negative procal and afebrile. Suspect related to hemoconcentration as dry mucosa on exam, H&H of 16.5&48.6. Started on IVF  Blood cultures pending

## 2020-09-02 NOTE — HOSPITAL COURSE
Shady Lopez 40 y.o. female placed in observation for dehydration due to NV. No further Nausea vomiting during observation stay. Renal function return to baseline after aggressive IV hydration in ED. Instruct patient to stop smoking marijuana. Patient denied use of marijuana despite positive drug screen. Patient reports NV since 2017 after cholecystectomy. Patient stable for discharge home with close follow up  PCP Dr. Rich for refer to GI for chronic nausea and vomiting.

## 2020-09-02 NOTE — PLAN OF CARE
Discharge planning assessment completed with patient's assistance.  Patient from home with spouse/significant other and independent. Has no DME or OP services.  Patient's PCP and preferred pharmacy updated in [employer].  Help at home will be . Patient will call [facility] for trnasportation home at discharge.        09/02/20 5436   Discharge Assessment   Assessment Type Discharge Planning Assessment   Confirmed/corrected address and phone number on facesheet? Yes   Assessment information obtained from? Patient   Expected Length of Stay (days) 1   Communicated expected length of stay with patient/caregiver yes   Prior to hospitilization cognitive status: Alert/Oriented   Prior to hospitalization functional status: Independent   Current cognitive status: Alert/Oriented   Current Functional Status: Independent   Facility Arrived From: home   Lives With significant other   Able to Return to Prior Arrangements yes   Is patient able to care for self after discharge? Yes   Who are your caregiver(s) and their phone number(s)? [phone]; John Obando   Patient's perception of discharge disposition home or selfcare   Readmission Within the Last 30 Days no previous admission in last 30 days   Patient currently being followed by outpatient case management? No   Patient currently receives any other outside agency services? No   Equipment Currently Used at Home none   Part D Coverage n/a   Do you have any problems affording any of your prescribed medications? No   Does the patient have transportation home? Yes  (Will call [employer] at discharge.)   Transportation Anticipated other (see comments)  ([facility])   Dialysis Name and Scheduled days n/a   Does the patient receive services at the [facility]? No   Discharge Plan A Home   Discharge Plan B Home   DME Needed Upon Discharge  none   Patient/Family in Agreement with Plan yes   Marissa Noonan LMSW, CCM  9/2/20

## 2020-09-03 VITALS
HEIGHT: 69 IN | TEMPERATURE: 98 F | DIASTOLIC BLOOD PRESSURE: 73 MMHG | BODY MASS INDEX: 34.45 KG/M2 | RESPIRATION RATE: 19 BRPM | OXYGEN SATURATION: 96 % | HEART RATE: 72 BPM | SYSTOLIC BLOOD PRESSURE: 148 MMHG | WEIGHT: 232.56 LBS

## 2020-09-03 LAB
ALBUMIN SERPL BCP-MCNC: 3.9 G/DL (ref 3.5–5.2)
ALP SERPL-CCNC: 121 U/L (ref 55–135)
ALT SERPL W/O P-5'-P-CCNC: 31 U/L (ref 10–44)
ANION GAP SERPL CALC-SCNC: 8 MMOL/L (ref 8–16)
AST SERPL-CCNC: 22 U/L (ref 10–40)
BASOPHILS # BLD AUTO: 0.04 K/UL (ref 0–0.2)
BASOPHILS NFR BLD: 0.4 % (ref 0–1.9)
BILIRUB SERPL-MCNC: 1.7 MG/DL (ref 0.1–1)
BUN SERPL-MCNC: 19 MG/DL (ref 6–20)
CALCIUM SERPL-MCNC: 8.9 MG/DL (ref 8.7–10.5)
CHLORIDE SERPL-SCNC: 104 MMOL/L (ref 95–110)
CO2 SERPL-SCNC: 22 MMOL/L (ref 23–29)
CREAT SERPL-MCNC: 0.9 MG/DL (ref 0.5–1.4)
DIFFERENTIAL METHOD: NORMAL
EOSINOPHIL # BLD AUTO: 0.2 K/UL (ref 0–0.5)
EOSINOPHIL NFR BLD: 1.5 % (ref 0–8)
ERYTHROCYTE [DISTWIDTH] IN BLOOD BY AUTOMATED COUNT: 13.1 % (ref 11.5–14.5)
EST. GFR  (AFRICAN AMERICAN): >60 ML/MIN/1.73 M^2
EST. GFR  (NON AFRICAN AMERICAN): >60 ML/MIN/1.73 M^2
GLUCOSE SERPL-MCNC: 84 MG/DL (ref 70–110)
HCT VFR BLD AUTO: 42 % (ref 37–48.5)
HGB BLD-MCNC: 14.2 G/DL (ref 12–16)
IMM GRANULOCYTES # BLD AUTO: 0.03 K/UL (ref 0–0.04)
IMM GRANULOCYTES NFR BLD AUTO: 0.3 % (ref 0–0.5)
LYMPHOCYTES # BLD AUTO: 3 K/UL (ref 1–4.8)
LYMPHOCYTES NFR BLD: 26.3 % (ref 18–48)
MCH RBC QN AUTO: 28.8 PG (ref 27–31)
MCHC RBC AUTO-ENTMCNC: 33.8 G/DL (ref 32–36)
MCV RBC AUTO: 85 FL (ref 82–98)
MONOCYTES # BLD AUTO: 0.9 K/UL (ref 0.3–1)
MONOCYTES NFR BLD: 7.9 % (ref 4–15)
NEUTROPHILS # BLD AUTO: 7.1 K/UL (ref 1.8–7.7)
NEUTROPHILS NFR BLD: 63.6 % (ref 38–73)
NRBC BLD-RTO: 0 /100 WBC
PLATELET # BLD AUTO: 314 K/UL (ref 150–350)
PMV BLD AUTO: 11 FL (ref 9.2–12.9)
POTASSIUM SERPL-SCNC: 3.4 MMOL/L (ref 3.5–5.1)
PROT SERPL-MCNC: 7 G/DL (ref 6–8.4)
RBC # BLD AUTO: 4.93 M/UL (ref 4–5.4)
SODIUM SERPL-SCNC: 134 MMOL/L (ref 136–145)
WBC # BLD AUTO: 11.2 K/UL (ref 3.9–12.7)

## 2020-09-03 PROCEDURE — 25000003 PHARM REV CODE 250: Performed by: NURSE PRACTITIONER

## 2020-09-03 PROCEDURE — 36415 COLL VENOUS BLD VENIPUNCTURE: CPT

## 2020-09-03 PROCEDURE — G0378 HOSPITAL OBSERVATION PER HR: HCPCS

## 2020-09-03 PROCEDURE — 63600175 PHARM REV CODE 636 W HCPCS: Performed by: PHYSICIAN ASSISTANT

## 2020-09-03 PROCEDURE — 25000003 PHARM REV CODE 250: Performed by: PHYSICIAN ASSISTANT

## 2020-09-03 PROCEDURE — 80053 COMPREHEN METABOLIC PANEL: CPT

## 2020-09-03 PROCEDURE — 85025 COMPLETE CBC W/AUTO DIFF WBC: CPT

## 2020-09-03 PROCEDURE — 96376 TX/PRO/DX INJ SAME DRUG ADON: CPT

## 2020-09-03 PROCEDURE — 96361 HYDRATE IV INFUSION ADD-ON: CPT

## 2020-09-03 RX ORDER — BUTALBITAL, ACETAMINOPHEN AND CAFFEINE 50; 325; 40 MG/1; MG/1; MG/1
1 TABLET ORAL EVERY 4 HOURS PRN
Status: DISCONTINUED | OUTPATIENT
Start: 2020-09-03 | End: 2020-09-03 | Stop reason: HOSPADM

## 2020-09-03 RX ORDER — POTASSIUM CHLORIDE 20 MEQ/1
40 TABLET, EXTENDED RELEASE ORAL ONCE
Status: COMPLETED | OUTPATIENT
Start: 2020-09-03 | End: 2020-09-03

## 2020-09-03 RX ADMIN — ONDANSETRON 4 MG: 2 INJECTION INTRAMUSCULAR; INTRAVENOUS at 04:09

## 2020-09-03 RX ADMIN — POTASSIUM CHLORIDE 40 MEQ: 1500 TABLET, EXTENDED RELEASE ORAL at 08:09

## 2020-09-03 RX ADMIN — BUTALBITAL, ACETAMINOPHEN, AND CAFFEINE 1 TABLET: 50; 325; 40 TABLET ORAL at 01:09

## 2020-09-03 RX ADMIN — LABETALOL HYDROCHLORIDE 300 MG: 100 TABLET, FILM COATED ORAL at 08:09

## 2020-09-03 RX ADMIN — GABAPENTIN 800 MG: 400 CAPSULE ORAL at 08:09

## 2020-09-03 RX ADMIN — BUTALBITAL, ACETAMINOPHEN, AND CAFFEINE 1 TABLET: 50; 325; 40 TABLET ORAL at 08:09

## 2020-09-03 RX ADMIN — TOPIRAMATE 50 MG: 25 TABLET, FILM COATED ORAL at 08:09

## 2020-09-03 NOTE — PROGRESS NOTES
WRITTEN HEALTHCARE DISCHARGE INFORMATION      Things that YOU are RESPONSIBLE for to Manage Your Care At Home:     1. Getting your prescriptions filled.  2. Taking you medications as directed. DO NOT MISS ANY DOSES!  3. Going to your follow-up doctor appointments. This is important because it allows the doctor to monitor your progress and to determine if any changes need to be made to your treatment plan.     If you are unable to make your follow up appointments, please call the number listed and reschedule this appointment.      ____________HELP AT HOME____________________     Experiencing any SIGNS or SYMPTOMS: YOU CAN     Schedule a same day appopintment with your Primary Care Doctor or  you can call [facility] On Call Nurse Care Line for 24/7 assistance at [phone]     If you are experience any signs or symptoms that have become severe, Call 911 and come to your nearest Emergency Room.     Thank you for choosing [facility] and allowing us to care for you.   From your care management team:      You should receive a call from [facility] Discharge Department within 48-72 hours to help manage your care after discharge. Please try to make sure that you answer your phone for this important phone call.    Follow-up Information     Schedule an appointment as soon as possible for a visit with Shin Cruz MD.    Specialty: Gastroenterology  Why: you will need a referral from your PCP to see this doctor  Contact information:  [address] [place] [address]  [address]  [facility]  [place] [address]  [phone]             Bibi Rich MD On 9/10/2020.    Specialty: Family Medicine  Why: appointment scheduled for September 10th at 11am  Contact information:  [address] [facility]  [place] [address]  [phone]

## 2020-09-03 NOTE — DISCHARGE SUMMARY
[facility] Hospital Medicine  Discharge Summary      Patient Name: Shady Lopez  MRN: [id number]  Admission Date: 9/2/2020  Hospital Length of Stay: 0 days  Discharge Date and Time:  09/03/2020 10:59 AM  Attending Physician: Dominic Duvall MD   Discharging Provider: Collette Aguirre NP  Primary Care Provider: Bibi Rich MD      HPI:   Shady Lopez 40 y.o. female with HTN, migraines, and s/p cholecystectomy presents to the hospital with a chief complaint of N/V. She reports 6 days ago she was seen in the ED for N/V and discharged. Her symptoms improved and she returned to baseline until yesterday. Since that time she has had profuse N/V associated with an epigastric abdominal pain that does not radiate and is worsened with palpation of her chest. She finds it improves if laying still. She denies any sick contacts or eating of undercooked foods. She reports a previous hospitalization for similar symptoms though she is unsure of the cause. She is s/p cholecystectomy. She denies fever, chest pain, SOB, leg swelling, syncope, hematemesis, melena, hematuria. She does find when she stands she sometimes feels dizzy, but this improved with laying flat.     In the ED, WBC of 18.41, H&& of 16.5&48.6, procal negative, BUN of 23 and Cr of 1.2, covid negative, lactic acid 1.6, CPK 35, UA with rare bacteria negative for leukocytes or nitrites.     Hospitalized in 1/2020 with UDS positive for THC and WBC of 26 with H&H of 18.5&51.8 with BUN/CR of 47/3.9.     * No surgery found *      Hospital Course:   Shady Lopez 40 y.o. female placed in observation for dehydration due to NV. No further Nausea vomiting during observation stay. Renal function return to baseline after aggressive IV hydration in ED. Instruct patient to stop smoking marijuana. Patient denied use of marijuana despite positive drug screen. Patient reports NV since 2017 after cholecystectomy. Patient stable for discharge home with  close follow up  PCP Dr. Rich for refer to GI for chronic nausea and vomiting.      Consults:     Migraines  Continue home topamax and gabapentin        Final Active Diagnoses:    Diagnosis Date Noted POA    PRINCIPAL PROBLEM:  Dehydration [E86.0] 09/02/2020 Yes    Leukocytosis [D72.829] 09/02/2020 Unknown    Essential hypertension [I10] 09/02/2020 Unknown    Migraines [G43.909] 09/02/2020 Yes      Problems Resolved During this Admission:       Discharged Condition: good    Disposition:     Follow Up:  Follow-up Information     Schedule an appointment as soon as possible for a visit with Shin Cruz MD.    Specialty: Gastroenterology  Contact information:  [address] [place] [address] [address]  [address]  [facility]  [place] [address] [phone]                 Patient Instructions:      Diet diabetic     Diet Cardiac     Notify your health care provider if you experience any of the following:  temperature >100.4     Notify your health care provider if you experience any of the following:  difficulty breathing or increased cough     Notify your health care provider if you experience any of the following:  increased confusion or weakness     Activity as tolerated       Significant Diagnostic Studies: Labs:   BMP:   Recent Labs   Lab 09/02/20  1206 09/03/20  0527    84   * 134*   K 4.0 3.4*   CL 94* 104   CO2 23 22*   BUN 23* 19   CREATININE 1.2 0.9   CALCIUM 10.7* 8.9    and All labs within the past 24 hours have been reviewed    Pending Diagnostic Studies:     None         Medications:  Reconciled Home Medications:      Medication List      CHANGE how you take these medications    labetaloL 200 MG tablet  Commonly known as: NORMODYNE  Take 1 tablet (200 mg total) by mouth every 12 (twelve) hours.  What changed: how much to take        CONTINUE taking these medications    albuterol 5 mg/mL nebulizer solution  Commonly known as: PROVENTIL  Take 2.5 mg by nebulization every 6  (six) hours as needed for Wheezing. Rescue     ferrous sulfate 324 mg (65 mg iron) Tbec  Take 325 mg by mouth once daily.     FIORICET ORAL  Take by mouth.     fluticasone propionate 50 mcg/actuation nasal spray  Commonly known as: FLONASE  1 spray by Each Nare route once daily.     montelukast 10 mg tablet  Commonly known as: SINGULAIR  Take 10 mg by mouth every evening.     ondansetron 4 MG tablet  Commonly known as: ZOFRAN  Take 1 tablet (4 mg total) by mouth every 6 (six) hours as needed.     oxyCODONE-acetaminophen 5-325 mg per tablet  Commonly known as: PERCOCET  Take 1 tablet by mouth every 4 (four) hours as needed for Pain.     topiramate 50 MG tablet  Commonly known as: TOPAMAX  Take 1 tablet (50 mg total) by mouth 2 (two) times daily. for 10 days     ULTRAM ORAL  Take by mouth.        STOP taking these medications    ibuprofen 600 MG tablet  Commonly known as: ADVIL,MOTRIN            Indwelling Lines/Drains at time of discharge:   Lines/Drains/Airways     None                 Time spent on the discharge of patient: 35 minutes  Patient was seen and examined on the date of discharge and determined to be suitable for discharge.         Collette Aguirre NP  Department of Hospital Medicine  [facility]

## 2020-09-03 NOTE — PLAN OF CARE
Problem: Adult Inpatient Plan of Care  Goal: Plan of Care Review  Outcome: Ongoing, Progressing     Problem: Fall Injury Risk  Goal: Absence of Fall and Fall-Related Injury  Outcome: Ongoing, Progressing     Problem: Pain Acute  Goal: Optimal Pain Control  Outcome: Ongoing, Progressing

## 2020-09-03 NOTE — PLAN OF CARE
09/03/20 1223   Final Note   Assessment Type Final Discharge Note   Anticipated Discharge Disposition Home   Hospital Follow Up  Appt(s) scheduled? Yes   Discharge plans and expectations educations in teach back method with documentation complete? Yes   Right Care Referral Info   Post Acute Recommendation No Care   Post-Acute Status   Post-Acute Authorization Other   Other Status No Post-Acute Service Needs   Pts nurse Pak advised that the pt can d/c from CM standpoint.

## 2020-09-03 NOTE — PROGRESS NOTES
Call placed to pts PCP office to schedule post hospital follow up appointment and entered into follow up tab to print on AVS at time of discharge.   No

## 2020-09-07 LAB
BACTERIA BLD CULT: NORMAL
BACTERIA BLD CULT: NORMAL

## 2021-01-23 ENCOUNTER — HOSPITAL ENCOUNTER (EMERGENCY)
Facility: HOSPITAL | Age: 41
Discharge: HOME | End: 2021-01-23
Attending: EMERGENCY MEDICINE
Payer: MEDICAID

## 2021-01-23 VITALS
SYSTOLIC BLOOD PRESSURE: 128 MMHG | OXYGEN SATURATION: 98 % | HEIGHT: 69 IN | TEMPERATURE: 98 F | BODY MASS INDEX: 34.96 KG/M2 | HEART RATE: 81 BPM | DIASTOLIC BLOOD PRESSURE: 71 MMHG | RESPIRATION RATE: 18 BRPM | WEIGHT: 236 LBS

## 2021-01-23 DIAGNOSIS — R10.13 EPIGASTRIC PAIN: ICD-10-CM

## 2021-01-23 DIAGNOSIS — R94.31 PROLONGED Q-T INTERVAL ON ECG: ICD-10-CM

## 2021-01-23 DIAGNOSIS — E87.6 HYPOKALEMIA: ICD-10-CM

## 2021-01-23 DIAGNOSIS — R11.2 NON-INTRACTABLE VOMITING WITH NAUSEA, UNSPECIFIED VOMITING TYPE: Primary | ICD-10-CM

## 2021-01-23 DIAGNOSIS — R11.15 CYCLICAL VOMITING: ICD-10-CM

## 2021-01-23 LAB
ALBUMIN SERPL BCP-MCNC: 5 G/DL (ref 3.5–5.2)
ALP SERPL-CCNC: 122 U/L (ref 55–135)
ALT SERPL W/O P-5'-P-CCNC: 27 U/L (ref 10–44)
ANION GAP SERPL CALC-SCNC: 13 MMOL/L (ref 8–16)
AST SERPL-CCNC: 18 U/L (ref 10–40)
B-HCG UR QL: NEGATIVE
BASOPHILS # BLD AUTO: 0.02 K/UL (ref 0–0.2)
BASOPHILS NFR BLD: 0.1 % (ref 0–1.9)
BILIRUB SERPL-MCNC: 0.9 MG/DL (ref 0.1–1)
BILIRUB UR QL STRIP: NEGATIVE
BUN SERPL-MCNC: 13 MG/DL (ref 6–20)
CALCIUM SERPL-MCNC: 10.1 MG/DL (ref 8.7–10.5)
CHLORIDE SERPL-SCNC: 99 MMOL/L (ref 95–110)
CLARITY UR: CLEAR
CO2 SERPL-SCNC: 25 MMOL/L (ref 23–29)
COLOR UR: YELLOW
CREAT SERPL-MCNC: 0.8 MG/DL (ref 0.5–1.4)
CTP QC/QA: YES
CTP QC/QA: YES
DIFFERENTIAL METHOD: ABNORMAL
EOSINOPHIL # BLD AUTO: 0 K/UL (ref 0–0.5)
EOSINOPHIL NFR BLD: 0.2 % (ref 0–8)
ERYTHROCYTE [DISTWIDTH] IN BLOOD BY AUTOMATED COUNT: 13.4 % (ref 11.5–14.5)
EST. GFR  (AFRICAN AMERICAN): >60 ML/MIN/1.73 M^2
EST. GFR  (NON AFRICAN AMERICAN): >60 ML/MIN/1.73 M^2
GLUCOSE SERPL-MCNC: 112 MG/DL (ref 70–110)
GLUCOSE UR QL STRIP: NEGATIVE
HCT VFR BLD AUTO: 41.5 % (ref 37–48.5)
HGB BLD-MCNC: 14.6 G/DL (ref 12–16)
HGB UR QL STRIP: NEGATIVE
IMM GRANULOCYTES # BLD AUTO: 0.08 K/UL (ref 0–0.04)
IMM GRANULOCYTES NFR BLD AUTO: 0.5 % (ref 0–0.5)
KETONES UR QL STRIP: ABNORMAL
LEUKOCYTE ESTERASE UR QL STRIP: NEGATIVE
LIPASE SERPL-CCNC: 45 U/L (ref 4–60)
LYMPHOCYTES # BLD AUTO: 2.2 K/UL (ref 1–4.8)
LYMPHOCYTES NFR BLD: 13.4 % (ref 18–48)
MAGNESIUM SERPL-MCNC: 2.2 MG/DL (ref 1.6–2.6)
MCH RBC QN AUTO: 28.5 PG (ref 27–31)
MCHC RBC AUTO-ENTMCNC: 35.2 G/DL (ref 32–36)
MCV RBC AUTO: 81 FL (ref 82–98)
MONOCYTES # BLD AUTO: 1 K/UL (ref 0.3–1)
MONOCYTES NFR BLD: 6.2 % (ref 4–15)
NEUTROPHILS # BLD AUTO: 13.3 K/UL (ref 1.8–7.7)
NEUTROPHILS NFR BLD: 79.6 % (ref 38–73)
NITRITE UR QL STRIP: NEGATIVE
NRBC BLD-RTO: 0 /100 WBC
PH UR STRIP: 7 [PH] (ref 5–8)
PLATELET # BLD AUTO: 451 K/UL (ref 150–350)
PMV BLD AUTO: 10.7 FL (ref 9.2–12.9)
POTASSIUM SERPL-SCNC: 2.9 MMOL/L (ref 3.5–5.1)
PROT SERPL-MCNC: 8.5 G/DL (ref 6–8.4)
PROT UR QL STRIP: ABNORMAL
RBC # BLD AUTO: 5.13 M/UL (ref 4–5.4)
SARS-COV-2 RDRP RESP QL NAA+PROBE: NEGATIVE
SODIUM SERPL-SCNC: 137 MMOL/L (ref 136–145)
SP GR UR STRIP: >1.03 (ref 1–1.03)
URN SPEC COLLECT METH UR: ABNORMAL
UROBILINOGEN UR STRIP-ACNC: NEGATIVE EU/DL
WBC # BLD AUTO: 16.71 K/UL (ref 3.9–12.7)

## 2021-01-23 PROCEDURE — 96361 HYDRATE IV INFUSION ADD-ON: CPT

## 2021-01-23 PROCEDURE — 93010 ELECTROCARDIOGRAM REPORT: CPT | Mod: ,,, | Performed by: INTERNAL MEDICINE

## 2021-01-23 PROCEDURE — 80053 COMPREHEN METABOLIC PANEL: CPT

## 2021-01-23 PROCEDURE — 96375 TX/PRO/DX INJ NEW DRUG ADDON: CPT

## 2021-01-23 PROCEDURE — 83690 ASSAY OF LIPASE: CPT

## 2021-01-23 PROCEDURE — 99285 EMERGENCY DEPT VISIT HI MDM: CPT | Mod: 25

## 2021-01-23 PROCEDURE — 85025 COMPLETE CBC W/AUTO DIFF WBC: CPT

## 2021-01-23 PROCEDURE — U0002 COVID-19 LAB TEST NON-CDC: HCPCS | Performed by: PHYSICIAN ASSISTANT

## 2021-01-23 PROCEDURE — 93010 EKG 12-LEAD: ICD-10-PCS | Mod: ,,, | Performed by: INTERNAL MEDICINE

## 2021-01-23 PROCEDURE — 63600175 PHARM REV CODE 636 W HCPCS: Performed by: PHYSICIAN ASSISTANT

## 2021-01-23 PROCEDURE — 81003 URINALYSIS AUTO W/O SCOPE: CPT

## 2021-01-23 PROCEDURE — 25000003 PHARM REV CODE 250: Performed by: PHYSICIAN ASSISTANT

## 2021-01-23 PROCEDURE — 81025 URINE PREGNANCY TEST: CPT | Performed by: PHYSICIAN ASSISTANT

## 2021-01-23 PROCEDURE — 93005 ELECTROCARDIOGRAM TRACING: CPT

## 2021-01-23 PROCEDURE — 83735 ASSAY OF MAGNESIUM: CPT

## 2021-01-23 PROCEDURE — 25500020 PHARM REV CODE 255: Performed by: PHYSICIAN ASSISTANT

## 2021-01-23 PROCEDURE — 96365 THER/PROPH/DIAG IV INF INIT: CPT | Mod: 59

## 2021-01-23 RX ORDER — ONDANSETRON 2 MG/ML
4 INJECTION INTRAMUSCULAR; INTRAVENOUS
Status: COMPLETED | OUTPATIENT
Start: 2021-01-23 | End: 2021-01-23

## 2021-01-23 RX ORDER — FAMOTIDINE 10 MG/ML
20 INJECTION INTRAVENOUS
Status: COMPLETED | OUTPATIENT
Start: 2021-01-23 | End: 2021-01-23

## 2021-01-23 RX ORDER — FAMOTIDINE 20 MG/1
20 TABLET, FILM COATED ORAL 2 TIMES DAILY
Qty: 28 TABLET | Refills: 0 | Status: SHIPPED | OUTPATIENT
Start: 2021-01-23 | End: 2021-02-06

## 2021-01-23 RX ORDER — DIPHENHYDRAMINE HYDROCHLORIDE 50 MG/ML
25 INJECTION INTRAMUSCULAR; INTRAVENOUS
Status: COMPLETED | OUTPATIENT
Start: 2021-01-23 | End: 2021-01-23

## 2021-01-23 RX ORDER — PROMETHAZINE HYDROCHLORIDE 25 MG/1
25 TABLET ORAL EVERY 6 HOURS PRN
Qty: 10 TABLET | Refills: 0 | Status: SHIPPED | OUTPATIENT
Start: 2021-01-23

## 2021-01-23 RX ORDER — HALOPERIDOL 5 MG/ML
5 INJECTION INTRAMUSCULAR
Status: DISCONTINUED | OUTPATIENT
Start: 2021-01-23 | End: 2021-01-23

## 2021-01-23 RX ORDER — PROCHLORPERAZINE EDISYLATE 5 MG/ML
10 INJECTION INTRAMUSCULAR; INTRAVENOUS ONCE
Status: COMPLETED | OUTPATIENT
Start: 2021-01-23 | End: 2021-01-23

## 2021-01-23 RX ORDER — ONDANSETRON 4 MG/1
4 TABLET, ORALLY DISINTEGRATING ORAL EVERY 6 HOURS PRN
Qty: 20 TABLET | Refills: 0 | OUTPATIENT
Start: 2021-01-23 | End: 2023-11-06

## 2021-01-23 RX ADMIN — IOHEXOL 80 ML: 350 INJECTION, SOLUTION INTRAVENOUS at 09:01

## 2021-01-23 RX ADMIN — FAMOTIDINE 20 MG: 10 INJECTION INTRAVENOUS at 04:01

## 2021-01-23 RX ADMIN — SODIUM CHLORIDE, SODIUM LACTATE, POTASSIUM CHLORIDE, AND CALCIUM CHLORIDE 1000 ML: .6; .31; .03; .02 INJECTION, SOLUTION INTRAVENOUS at 05:01

## 2021-01-23 RX ADMIN — DIPHENHYDRAMINE HYDROCHLORIDE 25 MG: 50 INJECTION INTRAMUSCULAR; INTRAVENOUS at 07:01

## 2021-01-23 RX ADMIN — PROMETHAZINE HYDROCHLORIDE 12.5 MG: 25 INJECTION INTRAMUSCULAR; INTRAVENOUS at 05:01

## 2021-01-23 RX ADMIN — SODIUM CHLORIDE, SODIUM LACTATE, POTASSIUM CHLORIDE, AND CALCIUM CHLORIDE 1000 ML: .6; .31; .03; .02 INJECTION, SOLUTION INTRAVENOUS at 07:01

## 2021-01-23 RX ADMIN — PROCHLORPERAZINE EDISYLATE 10 MG: 5 INJECTION INTRAMUSCULAR; INTRAVENOUS at 07:01

## 2021-01-23 RX ADMIN — POTASSIUM BICARBONATE 20 MEQ: 391 TABLET, EFFERVESCENT ORAL at 09:01

## 2021-01-23 RX ADMIN — ONDANSETRON 4 MG: 2 INJECTION INTRAMUSCULAR; INTRAVENOUS at 04:01

## 2021-02-24 ENCOUNTER — HOSPITAL ENCOUNTER (EMERGENCY)
Facility: HOSPITAL | Age: 41
Discharge: HOME | End: 2021-02-24
Attending: EMERGENCY MEDICINE
Payer: MEDICAID

## 2021-02-24 VITALS
OXYGEN SATURATION: 99 % | HEIGHT: 70 IN | WEIGHT: 240 LBS | BODY MASS INDEX: 34.36 KG/M2 | DIASTOLIC BLOOD PRESSURE: 60 MMHG | TEMPERATURE: 99 F | HEART RATE: 79 BPM | RESPIRATION RATE: 18 BRPM | SYSTOLIC BLOOD PRESSURE: 158 MMHG

## 2021-02-24 DIAGNOSIS — R11.2 NON-INTRACTABLE VOMITING WITH NAUSEA, UNSPECIFIED VOMITING TYPE: Primary | ICD-10-CM

## 2021-02-24 DIAGNOSIS — E86.0 DEHYDRATION: ICD-10-CM

## 2021-02-24 DIAGNOSIS — R10.13 EPIGASTRIC ABDOMINAL PAIN: ICD-10-CM

## 2021-02-24 LAB
ALBUMIN SERPL BCP-MCNC: 4.9 G/DL (ref 3.5–5.2)
ALP SERPL-CCNC: 161 U/L (ref 55–135)
ALT SERPL W/O P-5'-P-CCNC: 23 U/L (ref 10–44)
ANION GAP SERPL CALC-SCNC: 18 MMOL/L (ref 8–16)
AST SERPL-CCNC: 23 U/L (ref 10–40)
B-HCG UR QL: NEGATIVE
BACTERIA #/AREA URNS HPF: NORMAL /HPF
BASOPHILS # BLD AUTO: 0.03 K/UL (ref 0–0.2)
BASOPHILS NFR BLD: 0.2 % (ref 0–1.9)
BILIRUB SERPL-MCNC: 1.9 MG/DL (ref 0.1–1)
BILIRUB UR QL STRIP: NEGATIVE
BUN SERPL-MCNC: 24 MG/DL (ref 6–20)
CALCIUM SERPL-MCNC: 10.2 MG/DL (ref 8.7–10.5)
CHLORIDE SERPL-SCNC: 89 MMOL/L (ref 95–110)
CLARITY UR: CLEAR
CO2 SERPL-SCNC: 27 MMOL/L (ref 23–29)
COLOR UR: YELLOW
CREAT SERPL-MCNC: 1 MG/DL (ref 0.5–1.4)
CTP QC/QA: YES
DIFFERENTIAL METHOD: ABNORMAL
EOSINOPHIL # BLD AUTO: 0 K/UL (ref 0–0.5)
EOSINOPHIL NFR BLD: 0.2 % (ref 0–8)
ERYTHROCYTE [DISTWIDTH] IN BLOOD BY AUTOMATED COUNT: 12.7 % (ref 11.5–14.5)
EST. GFR  (AFRICAN AMERICAN): >60 ML/MIN/1.73 M^2
EST. GFR  (NON AFRICAN AMERICAN): >60 ML/MIN/1.73 M^2
GLUCOSE SERPL-MCNC: 129 MG/DL (ref 70–110)
GLUCOSE UR QL STRIP: NEGATIVE
HCT VFR BLD AUTO: 44.9 % (ref 37–48.5)
HGB BLD-MCNC: 16 G/DL (ref 12–16)
HGB UR QL STRIP: NEGATIVE
HYALINE CASTS #/AREA URNS LPF: 0 /LPF
IMM GRANULOCYTES # BLD AUTO: 0.09 K/UL (ref 0–0.04)
IMM GRANULOCYTES NFR BLD AUTO: 0.5 % (ref 0–0.5)
KETONES UR QL STRIP: ABNORMAL
LEUKOCYTE ESTERASE UR QL STRIP: NEGATIVE
LIPASE SERPL-CCNC: 28 U/L (ref 4–60)
LYMPHOCYTES # BLD AUTO: 2 K/UL (ref 1–4.8)
LYMPHOCYTES NFR BLD: 11.2 % (ref 18–48)
MCH RBC QN AUTO: 28.8 PG (ref 27–31)
MCHC RBC AUTO-ENTMCNC: 35.6 G/DL (ref 32–36)
MCV RBC AUTO: 81 FL (ref 82–98)
MICROSCOPIC COMMENT: NORMAL
MONOCYTES # BLD AUTO: 1.1 K/UL (ref 0.3–1)
MONOCYTES NFR BLD: 6.3 % (ref 4–15)
NEUTROPHILS # BLD AUTO: 14.7 K/UL (ref 1.8–7.7)
NEUTROPHILS NFR BLD: 81.6 % (ref 38–73)
NITRITE UR QL STRIP: NEGATIVE
NRBC BLD-RTO: 0 /100 WBC
PH UR STRIP: 6 [PH] (ref 5–8)
PLATELET # BLD AUTO: 522 K/UL (ref 150–350)
PMV BLD AUTO: 9.5 FL (ref 9.2–12.9)
POTASSIUM SERPL-SCNC: 3.3 MMOL/L (ref 3.5–5.1)
PROT SERPL-MCNC: 9.2 G/DL (ref 6–8.4)
PROT UR QL STRIP: ABNORMAL
RBC # BLD AUTO: 5.56 M/UL (ref 4–5.4)
RBC #/AREA URNS HPF: 0 /HPF (ref 0–4)
SODIUM SERPL-SCNC: 134 MMOL/L (ref 136–145)
SP GR UR STRIP: 1.02 (ref 1–1.03)
SQUAMOUS #/AREA URNS HPF: 1 /HPF
URN SPEC COLLECT METH UR: ABNORMAL
UROBILINOGEN UR STRIP-ACNC: ABNORMAL EU/DL
WBC # BLD AUTO: 18.03 K/UL (ref 3.9–12.7)
WBC #/AREA URNS HPF: 2 /HPF (ref 0–5)

## 2021-02-24 PROCEDURE — 63600175 PHARM REV CODE 636 W HCPCS: Performed by: PHYSICIAN ASSISTANT

## 2021-02-24 PROCEDURE — 96365 THER/PROPH/DIAG IV INF INIT: CPT

## 2021-02-24 PROCEDURE — 81025 URINE PREGNANCY TEST: CPT | Performed by: PHYSICIAN ASSISTANT

## 2021-02-24 PROCEDURE — 99284 EMERGENCY DEPT VISIT MOD MDM: CPT | Mod: 25

## 2021-02-24 PROCEDURE — 83690 ASSAY OF LIPASE: CPT

## 2021-02-24 PROCEDURE — 81000 URINALYSIS NONAUTO W/SCOPE: CPT

## 2021-02-24 PROCEDURE — 96375 TX/PRO/DX INJ NEW DRUG ADDON: CPT

## 2021-02-24 PROCEDURE — 25000003 PHARM REV CODE 250: Performed by: PHYSICIAN ASSISTANT

## 2021-02-24 PROCEDURE — 85025 COMPLETE CBC W/AUTO DIFF WBC: CPT

## 2021-02-24 PROCEDURE — 80053 COMPREHEN METABOLIC PANEL: CPT

## 2021-02-24 RX ORDER — METOCLOPRAMIDE HYDROCHLORIDE 5 MG/ML
10 INJECTION INTRAMUSCULAR; INTRAVENOUS
Status: COMPLETED | OUTPATIENT
Start: 2021-02-24 | End: 2021-02-24

## 2021-02-24 RX ORDER — PROMETHAZINE HYDROCHLORIDE 25 MG/1
25 TABLET ORAL EVERY 6 HOURS PRN
Qty: 15 TABLET | Refills: 0 | Status: SHIPPED | OUTPATIENT
Start: 2021-02-24

## 2021-02-24 RX ORDER — FAMOTIDINE 10 MG/ML
20 INJECTION INTRAVENOUS
Status: COMPLETED | OUTPATIENT
Start: 2021-02-24 | End: 2021-02-24

## 2021-02-24 RX ORDER — DIPHENHYDRAMINE HYDROCHLORIDE 50 MG/ML
25 INJECTION INTRAMUSCULAR; INTRAVENOUS
Status: COMPLETED | OUTPATIENT
Start: 2021-02-24 | End: 2021-02-24

## 2021-02-24 RX ORDER — METOCLOPRAMIDE 10 MG/1
10 TABLET ORAL EVERY 6 HOURS
Qty: 30 TABLET | Refills: 0 | Status: SHIPPED | OUTPATIENT
Start: 2021-02-24 | End: 2021-02-24 | Stop reason: SDUPTHER

## 2021-02-24 RX ORDER — ONDANSETRON 4 MG/1
8 TABLET, FILM COATED ORAL EVERY 12 HOURS PRN
Qty: 12 TABLET | Refills: 0 | OUTPATIENT
Start: 2021-02-24 | End: 2023-11-06

## 2021-02-24 RX ORDER — METOCLOPRAMIDE 10 MG/1
10 TABLET ORAL EVERY 6 HOURS
Qty: 30 TABLET | Refills: 0 | Status: SHIPPED | OUTPATIENT
Start: 2021-02-24

## 2021-02-24 RX ORDER — ONDANSETRON 2 MG/ML
4 INJECTION INTRAMUSCULAR; INTRAVENOUS
Status: COMPLETED | OUTPATIENT
Start: 2021-02-24 | End: 2021-02-24

## 2021-02-24 RX ORDER — KETOROLAC TROMETHAMINE 30 MG/ML
15 INJECTION, SOLUTION INTRAMUSCULAR; INTRAVENOUS
Status: COMPLETED | OUTPATIENT
Start: 2021-02-24 | End: 2021-02-24

## 2021-02-24 RX ADMIN — KETOROLAC TROMETHAMINE 15 MG: 30 INJECTION, SOLUTION INTRAMUSCULAR; INTRAVENOUS at 02:02

## 2021-02-24 RX ADMIN — METOCLOPRAMIDE 10 MG: 5 INJECTION, SOLUTION INTRAMUSCULAR; INTRAVENOUS at 03:02

## 2021-02-24 RX ADMIN — SODIUM CHLORIDE 1000 ML: 0.9 INJECTION, SOLUTION INTRAVENOUS at 02:02

## 2021-02-24 RX ADMIN — PROMETHAZINE HYDROCHLORIDE 25 MG: 25 INJECTION INTRAMUSCULAR; INTRAVENOUS at 02:02

## 2021-02-24 RX ADMIN — DIPHENHYDRAMINE HYDROCHLORIDE 25 MG: 50 INJECTION INTRAMUSCULAR; INTRAVENOUS at 03:02

## 2021-02-24 RX ADMIN — ONDANSETRON 4 MG: 2 INJECTION INTRAMUSCULAR; INTRAVENOUS at 01:02

## 2021-02-24 RX ADMIN — FAMOTIDINE 20 MG: 10 INJECTION INTRAVENOUS at 02:02

## 2021-07-27 ENCOUNTER — HOSPITAL ENCOUNTER (EMERGENCY)
Facility: HOSPITAL | Age: 41
Discharge: LEFT BEFORE DISCHARGE | End: 2021-07-27
Payer: MEDICAID

## 2021-07-27 VITALS
OXYGEN SATURATION: 99 % | TEMPERATURE: 99 F | BODY MASS INDEX: 34.36 KG/M2 | SYSTOLIC BLOOD PRESSURE: 170 MMHG | HEIGHT: 70 IN | DIASTOLIC BLOOD PRESSURE: 99 MMHG | WEIGHT: 240 LBS | HEART RATE: 99 BPM

## 2021-07-27 PROBLEM — N10 ACUTE PYELONEPHRITIS: Status: ACTIVE | Noted: 2020-01-22

## 2021-07-27 LAB
ALBUMIN SERPL BCP-MCNC: 4.8 G/DL (ref 3.5–5.2)
ALP SERPL-CCNC: 187 U/L (ref 55–135)
ALT SERPL W/O P-5'-P-CCNC: 27 U/L (ref 10–44)
ANION GAP SERPL CALC-SCNC: 18 MMOL/L (ref 8–16)
AST SERPL-CCNC: 37 U/L (ref 10–40)
B-HCG UR QL: NEGATIVE
BACTERIA #/AREA URNS HPF: ABNORMAL /HPF
BASOPHILS # BLD AUTO: 0.03 K/UL (ref 0–0.2)
BASOPHILS NFR BLD: 0.2 % (ref 0–1.9)
BILIRUB SERPL-MCNC: 1.5 MG/DL (ref 0.1–1)
BILIRUB UR QL STRIP: ABNORMAL
BUN SERPL-MCNC: 27 MG/DL (ref 6–20)
CALCIUM SERPL-MCNC: 10.3 MG/DL (ref 8.7–10.5)
CHLORIDE SERPL-SCNC: 83 MMOL/L (ref 95–110)
CLARITY UR: CLEAR
CO2 SERPL-SCNC: 29 MMOL/L (ref 23–29)
COLOR UR: YELLOW
CREAT SERPL-MCNC: 1.2 MG/DL (ref 0.5–1.4)
CTP QC/QA: YES
DIFFERENTIAL METHOD: ABNORMAL
EOSINOPHIL # BLD AUTO: 0 K/UL (ref 0–0.5)
EOSINOPHIL NFR BLD: 0.1 % (ref 0–8)
ERYTHROCYTE [DISTWIDTH] IN BLOOD BY AUTOMATED COUNT: 12.4 % (ref 11.5–14.5)
EST. GFR  (AFRICAN AMERICAN): >60 ML/MIN/1.73 M^2
EST. GFR  (NON AFRICAN AMERICAN): 56 ML/MIN/1.73 M^2
GLUCOSE SERPL-MCNC: 122 MG/DL (ref 70–110)
GLUCOSE UR QL STRIP: ABNORMAL
HCT VFR BLD AUTO: 45.9 % (ref 37–48.5)
HGB BLD-MCNC: 16.1 G/DL (ref 12–16)
HGB UR QL STRIP: ABNORMAL
HYALINE CASTS #/AREA URNS LPF: 20 /LPF
IMM GRANULOCYTES # BLD AUTO: 0.12 K/UL (ref 0–0.04)
IMM GRANULOCYTES NFR BLD AUTO: 0.6 % (ref 0–0.5)
KETONES UR QL STRIP: ABNORMAL
LEUKOCYTE ESTERASE UR QL STRIP: NEGATIVE
LIPASE SERPL-CCNC: 63 U/L (ref 4–60)
LYMPHOCYTES # BLD AUTO: 1.8 K/UL (ref 1–4.8)
LYMPHOCYTES NFR BLD: 9.3 % (ref 18–48)
MCH RBC QN AUTO: 28.6 PG (ref 27–31)
MCHC RBC AUTO-ENTMCNC: 35.1 G/DL (ref 32–36)
MCV RBC AUTO: 82 FL (ref 82–98)
MICROSCOPIC COMMENT: ABNORMAL
MONOCYTES # BLD AUTO: 1.4 K/UL (ref 0.3–1)
MONOCYTES NFR BLD: 7 % (ref 4–15)
NEUTROPHILS # BLD AUTO: 16.5 K/UL (ref 1.8–7.7)
NEUTROPHILS NFR BLD: 82.8 % (ref 38–73)
NITRITE UR QL STRIP: NEGATIVE
NRBC BLD-RTO: 0 /100 WBC
PH UR STRIP: 6 [PH] (ref 5–8)
PLATELET # BLD AUTO: 525 K/UL (ref 150–450)
PMV BLD AUTO: 10.6 FL (ref 9.2–12.9)
POTASSIUM SERPL-SCNC: 2.8 MMOL/L (ref 3.5–5.1)
PROT SERPL-MCNC: 9.1 G/DL (ref 6–8.4)
PROT UR QL STRIP: ABNORMAL
RBC # BLD AUTO: 5.62 M/UL (ref 4–5.4)
RBC #/AREA URNS HPF: 1 /HPF (ref 0–4)
SODIUM SERPL-SCNC: 130 MMOL/L (ref 136–145)
SP GR UR STRIP: >1.03 (ref 1–1.03)
URN SPEC COLLECT METH UR: ABNORMAL
UROBILINOGEN UR STRIP-ACNC: ABNORMAL EU/DL
WBC # BLD AUTO: 19.88 K/UL (ref 3.9–12.7)
WBC #/AREA URNS HPF: 18 /HPF (ref 0–5)

## 2021-07-27 PROCEDURE — 85025 COMPLETE CBC W/AUTO DIFF WBC: CPT | Performed by: EMERGENCY MEDICINE

## 2021-07-27 PROCEDURE — 87086 URINE CULTURE/COLONY COUNT: CPT | Performed by: EMERGENCY MEDICINE

## 2021-07-27 PROCEDURE — 81000 URINALYSIS NONAUTO W/SCOPE: CPT | Performed by: EMERGENCY MEDICINE

## 2021-07-27 PROCEDURE — 81025 URINE PREGNANCY TEST: CPT | Performed by: EMERGENCY MEDICINE

## 2021-07-27 PROCEDURE — 83690 ASSAY OF LIPASE: CPT | Performed by: EMERGENCY MEDICINE

## 2021-07-27 PROCEDURE — 99283 EMERGENCY DEPT VISIT LOW MDM: CPT

## 2021-07-27 PROCEDURE — 80053 COMPREHEN METABOLIC PANEL: CPT | Performed by: EMERGENCY MEDICINE

## 2021-07-27 RX ORDER — ONDANSETRON 4 MG/1
4 TABLET, ORALLY DISINTEGRATING ORAL
Status: DISCONTINUED | OUTPATIENT
Start: 2021-07-27 | End: 2021-07-27 | Stop reason: HOSPADM

## 2021-07-29 LAB — BACTERIA UR CULT: NORMAL

## 2021-09-24 NOTE — ED TRIAGE NOTES
Pt states she fell down about three sreps at around 10 PM. Pt denies LOC. Pt states that she fell on her right hand. Pt states she went to sleep and awoke in pain. Pt c/o pain to right hand and right knee. Pt states hand is swollen. Pt rates pain at 9 out of 10.   
Constitutional:  See HPI.   Eyes:  No visual changes, eye pain or discharge.  ENMT:  No hearing changes, pain, discharge or infections. No neck pain or stiffness.  Cardiac:  No chest pain, SOB or edema. No chest pain with exertion.  Respiratory:  No cough or respiratory distress. No hemoptysis.  GI:  No  vomiting, diarrhea   :  No dysuria, frequency, hematuria  MS:  No joint pain or back pain.  Neuro:  No LOC. No headache or weakness.    Skin:  No skin rash.  Except as in HPI, all other review of systems is negative

## 2023-10-07 ENCOUNTER — HOSPITAL ENCOUNTER (EMERGENCY)
Facility: HOSPITAL | Age: 43
Discharge: HOME | End: 2023-10-07
Attending: EMERGENCY MEDICINE
Payer: MEDICAID

## 2023-10-07 VITALS
TEMPERATURE: 98 F | HEIGHT: 69 IN | HEART RATE: 88 BPM | WEIGHT: 240 LBS | RESPIRATION RATE: 18 BRPM | OXYGEN SATURATION: 99 % | SYSTOLIC BLOOD PRESSURE: 126 MMHG | DIASTOLIC BLOOD PRESSURE: 88 MMHG | BODY MASS INDEX: 35.55 KG/M2

## 2023-10-07 DIAGNOSIS — G43.909 MIGRAINE WITHOUT STATUS MIGRAINOSUS, NOT INTRACTABLE, UNSPECIFIED MIGRAINE TYPE: Primary | ICD-10-CM

## 2023-10-07 PROCEDURE — 96375 TX/PRO/DX INJ NEW DRUG ADDON: CPT

## 2023-10-07 PROCEDURE — 99284 EMERGENCY DEPT VISIT MOD MDM: CPT | Mod: 25

## 2023-10-07 PROCEDURE — 96365 THER/PROPH/DIAG IV INF INIT: CPT

## 2023-10-07 PROCEDURE — 25000003 PHARM REV CODE 250

## 2023-10-07 PROCEDURE — 63600175 PHARM REV CODE 636 W HCPCS

## 2023-10-07 RX ORDER — DIPHENHYDRAMINE HYDROCHLORIDE 50 MG/ML
25 INJECTION INTRAMUSCULAR; INTRAVENOUS
Status: COMPLETED | OUTPATIENT
Start: 2023-10-07 | End: 2023-10-07

## 2023-10-07 RX ORDER — MAGNESIUM SULFATE HEPTAHYDRATE 40 MG/ML
2 INJECTION, SOLUTION INTRAVENOUS ONCE
Status: COMPLETED | OUTPATIENT
Start: 2023-10-07 | End: 2023-10-07

## 2023-10-07 RX ORDER — PROCHLORPERAZINE EDISYLATE 5 MG/ML
10 INJECTION INTRAMUSCULAR; INTRAVENOUS
Status: COMPLETED | OUTPATIENT
Start: 2023-10-07 | End: 2023-10-07

## 2023-10-07 RX ADMIN — MAGNESIUM SULFATE HEPTAHYDRATE 2 G: 40 INJECTION, SOLUTION INTRAVENOUS at 02:10

## 2023-10-07 RX ADMIN — PROCHLORPERAZINE EDISYLATE 10 MG: 5 INJECTION INTRAMUSCULAR; INTRAVENOUS at 01:10

## 2023-10-07 RX ADMIN — SODIUM CHLORIDE 1000 ML: 9 INJECTION, SOLUTION INTRAVENOUS at 02:10

## 2023-10-07 RX ADMIN — DIPHENHYDRAMINE HYDROCHLORIDE 25 MG: 50 INJECTION INTRAMUSCULAR; INTRAVENOUS at 02:10

## 2023-10-07 NOTE — Clinical Note
"Shady Whiting" John was seen and treated in our emergency department on 10/7/2023.  She may return to work on 10/08/2023.       If you have any questions or concerns, please don't hesitate to call.      Jerrica Morel RN    "

## 2023-10-07 NOTE — ED PROVIDER NOTES
Encounter Date: 10/7/2023       History     Chief Complaint   Patient presents with    Headache     Hx of migraine     43-year-old female with past medical history of migraines on triptans now presenting with chief complaint of migraine.  Patient reports that for the past 5 days she is been experiencing worsening headache which feels like a typical migraine for her, is 10/10, denies any thunderclap appearance, any numbness or weakness.  Patient tells me that the headache is frontal and dull in nature.  Additionally patient denies any other associated symptoms including fevers, nausea, vomiting, bowel changes.    The history is provided by the patient.     Review of patient's allergies indicates:  No Known Allergies  Past Medical History:   Diagnosis Date    Bronchitis     Hypertension     Migraine headache     Migraines      Past Surgical History:   Procedure Laterality Date     SECTION      GALLBLADDER SURGERY  2017    TUBAL LIGATION       Family History   Problem Relation Age of Onset    Asthma Mother     Heart disease Father      Social History     Tobacco Use    Smoking status: Former     Current packs/day: 0.00     Types: Cigarettes     Start date: 3/1/1998     Quit date: 3/1/2008     Years since quitting: 15.6    Smokeless tobacco: Never   Substance Use Topics    Alcohol use: Yes     Alcohol/week: 2.0 standard drinks of alcohol     Types: 2 Cans of beer per week     Comment: ocassionally    Drug use: No     Review of Systems  See HPI    Physical Exam     Initial Vitals [10/07/23 1243]   BP Pulse Resp Temp SpO2   (!) 128/97 102 20 98.5 °F (36.9 °C) 99 %      MAP       --         Physical Exam    Nursing note and vitals reviewed.      Gen:  Uncomfortable appearing and wearing sunglasses.  AxOx3, well nourished, appears stated age, no pallor, no jaundice, appears well hydrated  Eye: EOMI, no scleral icterus, no periorbital edema or ecchymosis  Head: Normocephalic, atraumatic, no lesions, scalp appears  normal  ENT: Neck supple, no stridor, no masses, no drooling or voice changes  CVS: All distal pulses intact with normal rate and rhythm, no JVD, normal S1/S2, no murmur  Pulm: Normal breath sounds, no wheezes, rales or rhonchi, no increased work of breathing  Abd:  Nondistended, soft, nontender, no organomegaly, no CVAT  Ext: No edema, no lesions, rashes, or deformity  Neuro: GCS15, moving all extremities, gait intact, face grossly symmetric  Psych: normal affect, cooperative, well groomed, makes good eye contact      ED Course   Procedures  Labs Reviewed   HIV 1 / 2 ANTIBODY   HEPATITIS C ANTIBODY          Imaging Results    None          Medications   prochlorperazine injection Soln 10 mg (10 mg Intravenous Given 10/7/23 1358)   magnesium sulfate 2g in water 50mL IVPB (premix) (0 g Intravenous Stopped 10/7/23 1456)   sodium chloride 0.9% bolus 1,000 mL 1,000 mL (0 mLs Intravenous Stopped 10/7/23 1455)   diphenhydrAMINE injection 25 mg (25 mg Intravenous Given 10/7/23 1408)     Medical Decision Making  Initial assessment  43-year-old female with history of migraines now presents with migraine. Patient is able to vocalise, breathing spontaneously, hemodynamically stable, oriented, moving all 4 limbs spontaneously. Examination consistent with uncomfortable appearing patient with normal neuro exam.      Differential diagnosis  Migraine  Considered but doubt intracranial bleeding      ED management  Patient was given IV fluids, Compazine, Benadryl, and magnesium.  On re-evaluation patient reports that her headache is now 5/10 and continuously improving as she is receiving medication through IV.  Patient endorsed improved headache and patient was discharged home.  Patient remained stable      Risk  Prescription drug management.              Attending Attestation:   Physician Attestation Statement for Resident:  As the supervising MD   Physician Attestation Statement: I have personally seen and examined this patient.    I agree with the above history.  -:   As the supervising MD I agree with the above PE.     As the supervising MD I agree with the above treatment, course, plan, and disposition.   -: 43-year-old female with history of migraine headaches, presents for a migraine headache similar to prior episodes of migraines, no new neurologic deficit, no fever   Patient received Compazine and magnesium in the emergency department   Patient headache free requesting to be discharged                                   Clinical Impression:   Final diagnoses:  [G43.909] Migraine without status migrainosus, not intractable, unspecified migraine type (Primary)        ED Disposition Condition    Discharge Stable          ED Prescriptions    None       Follow-up Information       Follow up With Specialties Details Why Contact Info    Bibi Rich MD Family Medicine Schedule an appointment as soon as possible for a visit   [address] [facility] [id number] [phone]      [facility] - Emergency Dept Emergency Medicine  As needed, If symptoms worsen [address] [facility] [address] [phone]             Major Rivas MD  Resident  10/07/23 1620       India North MD  10/07/23 2123

## 2023-10-07 NOTE — ED NOTES
States migraine 10/10 x 3 days. Hx migraines, but her regular meds rizatriptan and topamax has not tricia working. Photosensitivity and nausea.     Patient identifiers for Shady Lopez 43 y.o. female checked and correct.  Chief Complaint   Patient presents with    Headache     Hx of migraine     Past Medical History:   Diagnosis Date    Bronchitis     Hypertension     Migraine headache     Migraines      Allergies reported: Review of patient's allergies indicates:  No Known Allergies    Appearance: Pt awake, alert & oriented to person, place & time. Pt in no acute distress at present time. Pt is clean and well groomed with clothes appropriately fastened.   Skin: Skin warm, dry & intact. Color consistent with ethnicity. Mucous membranes moist. No breakdown or brusing noted.   Musculoskeletal: Patient moving all extremities well, no obvious swelling or deformities noted.   Respiratory: Respirations spontaneous, even, and non-labored. Visible chest rise noted. Airway is open and patent. No accessory muscle use noted.   Neurologic: Sensation is intact. Speech is clear and appropriate. Eyes open spontaneously, behavior appropriate to situation, follows commands, facial expression symmetrical, bilateral hand grasp equal and even, purposeful motor response noted.  Cardiac: All peripheral pulses present. No Bilateral lower extremity edema. Cap refill is <3 seconds.  Abdomen: Abdomen soft, non distended, non tender to palpation.   : Pt voids independently, denies dysuria, hematuria, frequency.

## 2023-11-06 ENCOUNTER — HOSPITAL ENCOUNTER (EMERGENCY)
Facility: HOSPITAL | Age: 43
Discharge: HOME | End: 2023-11-06
Attending: STUDENT IN AN ORGANIZED HEALTH CARE EDUCATION/TRAINING PROGRAM
Payer: MEDICAID

## 2023-11-06 VITALS
OXYGEN SATURATION: 96 % | RESPIRATION RATE: 18 BRPM | WEIGHT: 240 LBS | DIASTOLIC BLOOD PRESSURE: 90 MMHG | TEMPERATURE: 98 F | SYSTOLIC BLOOD PRESSURE: 141 MMHG | HEART RATE: 72 BPM | BODY MASS INDEX: 35.44 KG/M2

## 2023-11-06 DIAGNOSIS — K57.32 SIGMOID DIVERTICULITIS: Primary | ICD-10-CM

## 2023-11-06 LAB
ALBUMIN SERPL BCP-MCNC: 3.7 G/DL (ref 3.5–5.2)
ALP SERPL-CCNC: 143 U/L (ref 55–135)
ALT SERPL W/O P-5'-P-CCNC: 13 U/L (ref 10–44)
ANION GAP SERPL CALC-SCNC: 7 MMOL/L (ref 8–16)
AST SERPL-CCNC: 14 U/L (ref 10–40)
B-HCG UR QL: NEGATIVE
BACTERIA #/AREA URNS AUTO: NORMAL /HPF
BASOPHILS # BLD AUTO: 0.05 K/UL (ref 0–0.2)
BASOPHILS NFR BLD: 0.4 % (ref 0–1.9)
BILIRUB SERPL-MCNC: 0.6 MG/DL (ref 0.1–1)
BILIRUB UR QL STRIP: NEGATIVE
BUN SERPL-MCNC: 6 MG/DL (ref 6–20)
CALCIUM SERPL-MCNC: 9.3 MG/DL (ref 8.7–10.5)
CHLORIDE SERPL-SCNC: 102 MMOL/L (ref 95–110)
CLARITY UR REFRACT.AUTO: CLEAR
CO2 SERPL-SCNC: 26 MMOL/L (ref 23–29)
COLOR UR AUTO: YELLOW
CREAT SERPL-MCNC: 0.8 MG/DL (ref 0.5–1.4)
CTP QC/QA: YES
DIFFERENTIAL METHOD: ABNORMAL
EOSINOPHIL # BLD AUTO: 0.2 K/UL (ref 0–0.5)
EOSINOPHIL NFR BLD: 1.4 % (ref 0–8)
ERYTHROCYTE [DISTWIDTH] IN BLOOD BY AUTOMATED COUNT: 14 % (ref 11.5–14.5)
EST. GFR  (NO RACE VARIABLE): >60 ML/MIN/1.73 M^2
GLUCOSE SERPL-MCNC: 140 MG/DL (ref 70–110)
GLUCOSE UR QL STRIP: NEGATIVE
HCT VFR BLD AUTO: 40.9 % (ref 37–48.5)
HGB BLD-MCNC: 13.2 G/DL (ref 12–16)
HGB UR QL STRIP: NEGATIVE
HYALINE CASTS UR QL AUTO: 0 /LPF
IMM GRANULOCYTES # BLD AUTO: 0.06 K/UL (ref 0–0.04)
IMM GRANULOCYTES NFR BLD AUTO: 0.4 % (ref 0–0.5)
KETONES UR QL STRIP: ABNORMAL
LEUKOCYTE ESTERASE UR QL STRIP: NEGATIVE
LYMPHOCYTES # BLD AUTO: 2.4 K/UL (ref 1–4.8)
LYMPHOCYTES NFR BLD: 17.1 % (ref 18–48)
MCH RBC QN AUTO: 27.6 PG (ref 27–31)
MCHC RBC AUTO-ENTMCNC: 32.3 G/DL (ref 32–36)
MCV RBC AUTO: 86 FL (ref 82–98)
MICROSCOPIC COMMENT: NORMAL
MONOCYTES # BLD AUTO: 0.6 K/UL (ref 0.3–1)
MONOCYTES NFR BLD: 4 % (ref 4–15)
NEUTROPHILS # BLD AUTO: 10.7 K/UL (ref 1.8–7.7)
NEUTROPHILS NFR BLD: 76.7 % (ref 38–73)
NITRITE UR QL STRIP: NEGATIVE
NRBC BLD-RTO: 0 /100 WBC
PH UR STRIP: 6 [PH] (ref 5–8)
PLATELET # BLD AUTO: 409 K/UL (ref 150–450)
PMV BLD AUTO: 10 FL (ref 9.2–12.9)
POTASSIUM SERPL-SCNC: 3.9 MMOL/L (ref 3.5–5.1)
PROT SERPL-MCNC: 7.4 G/DL (ref 6–8.4)
PROT UR QL STRIP: ABNORMAL
RBC # BLD AUTO: 4.78 M/UL (ref 4–5.4)
RBC #/AREA URNS AUTO: 2 /HPF (ref 0–4)
SODIUM SERPL-SCNC: 135 MMOL/L (ref 136–145)
SP GR UR STRIP: 1.02 (ref 1–1.03)
SQUAMOUS #/AREA URNS AUTO: 1 /HPF
URN SPEC COLLECT METH UR: ABNORMAL
WBC # BLD AUTO: 13.89 K/UL (ref 3.9–12.7)
WBC #/AREA URNS AUTO: 1 /HPF (ref 0–5)

## 2023-11-06 PROCEDURE — 81025 URINE PREGNANCY TEST: CPT | Performed by: EMERGENCY MEDICINE

## 2023-11-06 PROCEDURE — 63600175 PHARM REV CODE 636 W HCPCS: Performed by: PHYSICIAN ASSISTANT

## 2023-11-06 PROCEDURE — 25000003 PHARM REV CODE 250: Performed by: PHYSICIAN ASSISTANT

## 2023-11-06 PROCEDURE — 96361 HYDRATE IV INFUSION ADD-ON: CPT

## 2023-11-06 PROCEDURE — 80053 COMPREHEN METABOLIC PANEL: CPT | Performed by: EMERGENCY MEDICINE

## 2023-11-06 PROCEDURE — 96374 THER/PROPH/DIAG INJ IV PUSH: CPT

## 2023-11-06 PROCEDURE — 85025 COMPLETE CBC W/AUTO DIFF WBC: CPT | Performed by: EMERGENCY MEDICINE

## 2023-11-06 PROCEDURE — 81001 URINALYSIS AUTO W/SCOPE: CPT | Performed by: EMERGENCY MEDICINE

## 2023-11-06 PROCEDURE — 99285 EMERGENCY DEPT VISIT HI MDM: CPT | Mod: 25

## 2023-11-06 RX ORDER — ACETAMINOPHEN 500 MG
1000 TABLET ORAL
Status: COMPLETED | OUTPATIENT
Start: 2023-11-06 | End: 2023-11-06

## 2023-11-06 RX ORDER — AMOXICILLIN AND CLAVULANATE POTASSIUM 875; 125 MG/1; MG/1
1 TABLET, FILM COATED ORAL
Status: COMPLETED | OUTPATIENT
Start: 2023-11-06 | End: 2023-11-06

## 2023-11-06 RX ORDER — ONDANSETRON 2 MG/ML
4 INJECTION INTRAMUSCULAR; INTRAVENOUS
Status: COMPLETED | OUTPATIENT
Start: 2023-11-06 | End: 2023-11-06

## 2023-11-06 RX ORDER — ONDANSETRON 4 MG/1
4 TABLET, ORALLY DISINTEGRATING ORAL EVERY 6 HOURS PRN
Qty: 12 TABLET | Refills: 0 | Status: SHIPPED | OUTPATIENT
Start: 2023-11-06

## 2023-11-06 RX ORDER — IBUPROFEN 800 MG/1
800 TABLET ORAL EVERY 6 HOURS PRN
Qty: 20 TABLET | Refills: 0 | Status: SHIPPED | OUTPATIENT
Start: 2023-11-06

## 2023-11-06 RX ORDER — AMOXICILLIN AND CLAVULANATE POTASSIUM 875; 125 MG/1; MG/1
1 TABLET, FILM COATED ORAL 2 TIMES DAILY
Qty: 14 TABLET | Refills: 0 | Status: SHIPPED | OUTPATIENT
Start: 2023-11-06

## 2023-11-06 RX ADMIN — ACETAMINOPHEN 1000 MG: 500 TABLET ORAL at 03:11

## 2023-11-06 RX ADMIN — ONDANSETRON 4 MG: 2 INJECTION INTRAMUSCULAR; INTRAVENOUS at 03:11

## 2023-11-06 RX ADMIN — AMOXICILLIN AND CLAVULANATE POTASSIUM 1 TABLET: 875; 125 TABLET, FILM COATED ORAL at 05:11

## 2023-11-06 NOTE — DISCHARGE INSTRUCTIONS
You have acute sigmoid diverticulitis.  You were given your 1st dose of Augmentin.  Continue every 12 hours for the next 10 days.    Eat soft/fluid food and advance to solids when you feel better.   When your symptoms resolve, it's a good idea to eat a lot of fiber to prevent another diverticulitis flare. Good sources of fiber include fruits, oats, beans, peas, and green leafy vegetables.   Take ibuprofen 800 mg every 6 hours as needed with food for anti-inflammatory relief.  Take Zofran every 6 hours as needed for your nausea.  You can take acetaminophen/tylenol 650 mg every 6 hours or 1000 mg every 8 hours for added relief.  Apply ice to the area for 10-20 minutes every 4 hours. You can apply heat 2 days after for the same duration and frequency.  Follow up with Colon and Rectal Surgery and PCP.  You will need a colonoscopy soon.  Return to the ER for new or worsening symptoms.  No future appointments.  Imaging Results               CT Renal Stone Study ABD Pelvis WO (Final result)  Result time 11/06/23 17:21:35      Final result by Lionel Mejía MD (11/06/23 17:21:35)                   Impression:      1. Findings suggesting acute, uncomplicated sigmoid diverticulitis.  Consider follow-up with imaging or endoscopy after therapy according to colorectal screening guidelines.  2. Right renal questionable punctate nonobstructing nephrolith.  3. Cholecystectomy.  This report was flagged in Epic as abnormal.      Electronically signed by: Lionel Mejía MD  Date:    11/06/2023  Time:    17:21               Narrative:    EXAMINATION:  CT RENAL STONE STUDY ABD PELVIS WO    CLINICAL HISTORY:  Flank pain, kidney stone suspected;    TECHNIQUE:  Low dose axial images, sagittal and coronal reformations were obtained from the lung bases to the pubic symphysis.  Contrast was not administered.    COMPARISON:  CT abdomen and pelvis and right upper quadrant ultrasound 01/23/2021    FINDINGS:  Lack of IV contrast limits evaluation  of soft tissue and vascular structures.    Imaged lung bases are clear.  Imaged base of the heart is within normal limits.    Remote cholecystectomy.  No biliary ductal dilatation.    Noncontrast appearance of the liver, pancreas, spleen, stomach, duodenum and bilateral adrenal glands are within normal limits.  No biliary ductal dilatation.    Bilateral kidneys are normal in size, shape and location.  Questionable punctate nephrolith at the right renal interpolar region.  No radiodense calculus seen within the right ureter, left collecting system or urinary bladder.  No hydronephrosis or significant perinephric stranding.  Urinary bladder is suboptimally distended.  Noncontrast appearance of the uterus and bilateral adnexa are grossly within normal limits.  Few scattered punctate pelvic phleboliths noted.    Several scattered colonic diverticula.  Abnormal circumferential wall thickening of the proximal/mid sigmoid colon with mild degree of adjacent fat stranding and likely reactive trace free fluid tracking to the mesenteric root and pelvis.  No evidence of bowel perforation or abscess at this time.  No bowel obstruction.  Appendix and terminal ileum are within normal limits.  No pneumatosis or portal venous gas.    No upper abdominal ascites.  No free air.  No significant atherosclerosis allowing for lack of IV contrast.    Extraperitoneal soft tissues are within normal limits.  Osseous structures appear stable without acute process seen.

## 2023-11-06 NOTE — FIRST PROVIDER EVALUATION
"Medical screening examination initiated.  I have conducted a focused provider triage encounter, findings are as follows:    Brief history of present illness:      L flank pain and chills x1 day  +urinary urgency and frequency x "a couple days"  No dysuria, no hematuria, no fevers  Pain 7/10, intermittent, shooting    Vitals:    11/06/23 1436 11/06/23 1437   BP:  130/74   Pulse:  85   Resp:  18   Weight: 108.9 kg (240 lb)        Pertinent physical exam:  AF, VSS, well appearing    Brief workup plan:  Labs, UA, CT    Preliminary workup initiated; this workup will be continued and followed by the physician or advanced practice provider that is assigned to the patient when roomed.  "

## 2023-11-06 NOTE — ED PROVIDER NOTES
Encounter Date: 2023       History     Chief Complaint   Patient presents with    Flank Pain     Starting yesterday. States urinary frequency, chills past few days     3:07 PM  Patient is a 43-year-old male with a history of HTN, migraines, bronchitis who presents to [facility] ED via POV for emergent evaluation of left flank pain, urgency, and frequency.    Patient reports onset of symptoms on Thursday 4 days ago, she endorses urgency and frequency without hematuria or dysuria.  Has had nonradiating left flank pain that is currently a 7/10 on the pain scale.  Her pain became worse yesterday and today which prompted her to come to the emergency department.  It is shooting and intermittent but has not moved.  She is had nausea without vomiting.  Has constant diarrhea unchanged from her baseline.  She is had chills and decreased appetite, but no fever, diaphoresis.  No history of kidney stones.  No recent antibiotic use.  She is status post cholecystectomy, 2 C sections, and tubal ligation.  She last had ibuprofen at 1000 without relief. Has a hx of diverticulitis. Has never had a C scope.        Review of patient's allergies indicates:  No Known Allergies  Past Medical History:   Diagnosis Date    Bronchitis     Hypertension     Migraine headache     Migraines      Past Surgical History:   Procedure Laterality Date     SECTION      GALLBLADDER SURGERY  2017    TUBAL LIGATION       Family History   Problem Relation Age of Onset    Asthma Mother     Heart disease Father      Social History     Tobacco Use    Smoking status: Former     Current packs/day: 0.00     Types: Cigarettes     Start date: 3/1/1998     Quit date: 3/1/2008     Years since quitting: 15.6    Smokeless tobacco: Never   Substance Use Topics    Alcohol use: Yes     Alcohol/week: 2.0 standard drinks of alcohol     Types: 2 Cans of beer per week     Comment: ocassionally    Drug use: No     Review of Systems   Constitutional:  Positive for  appetite change and chills. Negative for activity change, diaphoresis, fatigue and fever.   HENT:  Negative for sore throat.    Respiratory:  Negative for cough and shortness of breath.    Cardiovascular:  Negative for chest pain.   Gastrointestinal:  Positive for diarrhea and nausea. Negative for blood in stool, constipation and vomiting.   Genitourinary:  Positive for flank pain, frequency and urgency. Negative for dysuria.   Musculoskeletal:  Negative for back pain.   Skin:  Negative for rash.   Neurological:  Negative for weakness.   Hematological:  Does not bruise/bleed easily.       Physical Exam     Initial Vitals   BP Pulse Resp Temp SpO2   11/06/23 1437 11/06/23 1437 11/06/23 1437 11/06/23 1438 11/06/23 1438   130/74 85 18 97.9 °F (36.6 °C) 99 %      MAP       --                Physical Exam    Vitals reviewed.  Constitutional: She appears well-developed and well-nourished. She is not diaphoretic. She is cooperative.  Non-toxic appearance. She does not have a sickly appearance. She does not appear ill. No distress.   HENT:   Head: Normocephalic and atraumatic.   Nose: Nose normal.   Mouth/Throat: No trismus in the jaw.   Eyes: Conjunctivae and EOM are normal.   Neck:   Normal range of motion.  Pulmonary/Chest: No accessory muscle usage. No tachypnea. No respiratory distress.   Abdominal: Abdomen is soft. She exhibits no distension and no mass. There is abdominal tenderness.   No right CVA tenderness.  No left CVA tenderness.     There is no rebound, no guarding, no tenderness at McBurney's point and negative Jennings's sign.   Musculoskeletal:         General: Normal range of motion.      Cervical back: Normal range of motion.     Neurological: She is alert. She has normal strength.   Skin: Skin is warm and dry. No erythema. No pallor.         ED Course   Procedures  Labs Reviewed   CBC W/ AUTO DIFFERENTIAL - Abnormal; Notable for the following components:       Result Value    WBC 13.89 (*)     Gran # (ANC)  10.7 (*)     Immature Grans (Abs) 0.06 (*)     Gran % 76.7 (*)     Lymph % 17.1 (*)     All other components within normal limits   COMPREHENSIVE METABOLIC PANEL - Abnormal; Notable for the following components:    Sodium 135 (*)     Glucose 140 (*)     Alkaline Phosphatase 143 (*)     Anion Gap 7 (*)     All other components within normal limits   URINALYSIS, REFLEX TO URINE CULTURE - Abnormal; Notable for the following components:    Protein, UA 1+ (*)     Ketones, UA Trace (*)     All other components within normal limits    Narrative:     Specimen Source->Urine   URINALYSIS MICROSCOPIC    Narrative:     Specimen Source->Urine   POCT URINE PREGNANCY          Imaging Results               CT Renal Stone Study ABD Pelvis WO (Final result)  Result time 11/06/23 17:21:35      Final result by Lionel Mejía MD (11/06/23 17:21:35)                   Impression:      1. Findings suggesting acute, uncomplicated sigmoid diverticulitis.  Consider follow-up with imaging or endoscopy after therapy according to colorectal screening guidelines.  2. Right renal questionable punctate nonobstructing nephrolith.  3. Cholecystectomy.  This report was flagged in Epic as abnormal.      Electronically signed by: Lionel Mejía MD  Date:    11/06/2023  Time:    17:21               Narrative:    EXAMINATION:  CT RENAL STONE STUDY ABD PELVIS WO    CLINICAL HISTORY:  Flank pain, kidney stone suspected;    TECHNIQUE:  Low dose axial images, sagittal and coronal reformations were obtained from the lung bases to the pubic symphysis.  Contrast was not administered.    COMPARISON:  CT abdomen and pelvis and right upper quadrant ultrasound 01/23/2021    FINDINGS:  Lack of IV contrast limits evaluation of soft tissue and vascular structures.    Imaged lung bases are clear.  Imaged base of the heart is within normal limits.    Remote cholecystectomy.  No biliary ductal dilatation.    Noncontrast appearance of the liver, pancreas, spleen, stomach,  duodenum and bilateral adrenal glands are within normal limits.  No biliary ductal dilatation.    Bilateral kidneys are normal in size, shape and location.  Questionable punctate nephrolith at the right renal interpolar region.  No radiodense calculus seen within the right ureter, left collecting system or urinary bladder.  No hydronephrosis or significant perinephric stranding.  Urinary bladder is suboptimally distended.  Noncontrast appearance of the uterus and bilateral adnexa are grossly within normal limits.  Few scattered punctate pelvic phleboliths noted.    Several scattered colonic diverticula.  Abnormal circumferential wall thickening of the proximal/mid sigmoid colon with mild degree of adjacent fat stranding and likely reactive trace free fluid tracking to the mesenteric root and pelvis.  No evidence of bowel perforation or abscess at this time.  No bowel obstruction.  Appendix and terminal ileum are within normal limits.  No pneumatosis or portal venous gas.    No upper abdominal ascites.  No free air.  No significant atherosclerosis allowing for lack of IV contrast.    Extraperitoneal soft tissues are within normal limits.  Osseous structures appear stable without acute process seen.                                       Medications   sodium chloride 0.9% bolus 1,000 mL 1,000 mL (0 mLs Intravenous Stopped 11/6/23 1752)   acetaminophen tablet 1,000 mg (1,000 mg Oral Given 11/6/23 1558)   ondansetron injection 4 mg (4 mg Intravenous Given 11/6/23 1558)   amoxicillin-clavulanate 875-125mg per tablet 1 tablet (1 tablet Oral Given 11/6/23 1750)     Medical Decision Making  Patient is a 43-year-old male with a history of HTN, migraines, bronchitis who [place] [facility] [place] via [place] for emergent evaluation of left flank pain, urgency, and frequency.    Differential diagnosis includes but is not limited to UTI such as pyelonephritis, nephrolithiasis, hydronephrosis, diverticulitis, colitis, gastroenteritis,  strain, sprain, obstruction, hernia.  Patient's vitals are stable.  She is nontoxic appearing.  Doubt sepsis.  She had a CT scan in July of 2022 with normal vasculature.  I do not think this is aortic dissection or rupture.    Patient seen by provider in triage.  I agree with workup.  Will give medication for symptomatic relief and IV fluids.  Will continue monitor.        Amount and/or Complexity of Data Reviewed  Labs: ordered. Decision-making details documented in ED Course.  Radiology:  Decision-making details documented in ED Course.    Risk  OTC drugs.  Prescription drug management.               ED Course as of 11/06/23 1758   Mon Nov 06, 2023   1503 BP: 130/74 [CL]   1503 Temp: 97.9 °F (36.6 °C) [CL]   1503 Pulse: 85 [CL]   1503 Resp: 18 [CL]   1503 SpO2: 99 % [CL]   1542 WBC(!): 13.89 [CL]   1727 CT Renal Stone Study ABD Pelvis WO(!)  Findings suggesting acute, uncomplicated sigmoid diverticulitis.  Consider follow-up with imaging or endoscopy after therapy according to colorectal screening guidelines. [CL]   1733 Patient has leukocytosis.  Other labs within normal limits.  No signs of CONCEPCION.  No signs of sepsis or septic shock.  She is well-appearing.  She is appropriate for outpatient treatment of uncomplicated diverticulitis.  Patient updated with results.  Will treat with Augmentin.  1st dose here.  We discussed proper use.  Continue conservative management with ibuprofen and acetaminophen.  Return to ED precautions were given for new or worsening symptoms as discussed including those of complications of diverticulitis, and she voices her understanding.  All of her questions were answered.  Follow up with CRS.  She will need another evaluation since this is not her 1st episode of diverticulitis.  Patient stable for discharge. [CL]      ED Course User Index  [CL] Christel Campbell PA-C                  I have reviewed patient's chart and discussed this case with my supervising MD.     Christel Campbell,  JEROMY  Emergent Department  [facility]  Spectralink #[id number] or #[phone]    Clinical Impression:   Final diagnoses:  [K57.32] Sigmoid diverticulitis (Primary)        ED Disposition Condition    Discharge Stable          ED Prescriptions       Medication Sig Dispense Start Date End Date Auth. Provider    amoxicillin-clavulanate 875-125mg (AUGMENTIN) 875-125 mg per tablet Take 1 tablet by mouth 2 (two) times daily. 14 tablet 11/6/2023 -- Christel Campbell PA-C    ondansetron (ZOFRAN-ODT) 4 MG TbDL Take 1 tablet (4 mg total) by mouth every 6 (six) hours as needed (nausea). 12 tablet 11/6/2023 -- Christel Campbell PA-C    ibuprofen (ADVIL,MOTRIN) 800 MG tablet Take 1 tablet (800 mg total) by mouth every 6 (six) hours as needed for Pain. 20 tablet 11/6/2023 -- Christel Campbell PA-C Le, Catherine, PA-C  11/06/23 1800

## 2023-11-06 NOTE — ED TRIAGE NOTES
Pt. Reporting left sided flank pain and urinary frequency beginning yesterday. Reports a hx. Of kidney infections.

## 2023-11-06 NOTE — Clinical Note
"Shady Whiting" John was seen and treated in our emergency department on 11/6/2023.  She may return to work on 11/08/2023.       If you have any questions or concerns, please don't hesitate to call.      Christel Campbell PA-C"

## 2023-12-05 ENCOUNTER — HOSPITAL ENCOUNTER (EMERGENCY)
Facility: HOSPITAL | Age: 43
Discharge: HOME | End: 2023-12-05
Attending: EMERGENCY MEDICINE
Payer: MEDICAID

## 2023-12-05 VITALS
RESPIRATION RATE: 18 BRPM | OXYGEN SATURATION: 97 % | WEIGHT: 240 LBS | BODY MASS INDEX: 35.44 KG/M2 | HEART RATE: 77 BPM | TEMPERATURE: 98 F | DIASTOLIC BLOOD PRESSURE: 84 MMHG | SYSTOLIC BLOOD PRESSURE: 186 MMHG

## 2023-12-05 DIAGNOSIS — S80.12XA CONTUSION OF MULTIPLE SITES OF LEFT LOWER EXTREMITY, INITIAL ENCOUNTER: ICD-10-CM

## 2023-12-05 DIAGNOSIS — M54.16 LUMBAR RADICULOPATHY: ICD-10-CM
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
## 2023-12-05 PROCEDURE — 25000003 PHARM REV CODE 250: Performed by: PHYSICIAN ASSISTANT

## 2023-12-05 PROCEDURE — 99284 EMERGENCY DEPT VISIT MOD MDM: CPT

## 2023-12-05 RX ORDER — METHOCARBAMOL 500 MG/1
500 TABLET, FILM COATED ORAL 3 TIMES DAILY PRN
Qty: 15 TABLET | Refills: 0 | Status: SHIPPED | OUTPATIENT
Start: 2023-12-05

## 2023-12-05 RX ORDER — NAPROXEN 500 MG/1
500 TABLET ORAL
Status: COMPLETED | OUTPATIENT
Start: 2023-12-05 | End: 2023-12-05

## 2023-12-05 RX ORDER — METHOCARBAMOL 750 MG/1
750 TABLET, FILM COATED ORAL
Status: COMPLETED | OUTPATIENT
Start: 2023-12-05 | End: 2023-12-05

## 2023-12-05 RX ORDER — NAPROXEN 500 MG/1
500 TABLET ORAL 2 TIMES DAILY PRN
Qty: 10 TABLET | Refills: 0 | Status: SHIPPED | OUTPATIENT
Start: 2023-12-05

## 2023-12-05 RX ORDER — METHYLPREDNISOLONE 4 MG/1
TABLET ORAL
Qty: 21 EACH | Refills: 0 | Status: SHIPPED | OUTPATIENT
Start: 2023-12-05

## 2023-12-05 RX ORDER — ACETAMINOPHEN 500 MG
1000 TABLET ORAL
Status: COMPLETED | OUTPATIENT
Start: 2023-12-05 | End: 2023-12-05

## 2023-12-05 RX ADMIN — NAPROXEN 500 MG: 500 TABLET ORAL at 05:12

## 2023-12-05 RX ADMIN — ACETAMINOPHEN 1000 MG: 500 TABLET ORAL at 05:12

## 2023-12-05 RX ADMIN — METHOCARBAMOL 750 MG: 750 TABLET ORAL at 05:12

## 2023-12-05 NOTE — ED NOTES
Patient states left lower back pain that radiates down left leg after fall Friday. States  bruising noted toLeft leg, states she ran out of Ibuprofen yesterday

## 2023-12-05 NOTE — Clinical Note
"Shady Whiting" John was seen and treated in our emergency department on 12/5/2023.  She may return to work on 12/09/2023.       If you have any questions or concerns, please don't hesitate to call.      JACKELYN Mccallum PA-C/ CHEYANNE Brewster    "

## 2023-12-05 NOTE — ED PROVIDER NOTES
"Encounter Date: 2023       History     Chief Complaint   Patient presents with    Fall     Mechanical fall, now c/o left leg pain. NO LOC.      44 yo F hx of HTN, migraine headache who presents to the ED with chief complaint of left sided back and hip pain that began 5 days ago. She reports that her left leg "gave out from underneath" her, causing her to fall onto her left side. No head trauma or LOC. She reports significant bruising to the left leg. She is ambulatory. Her pain is mostly to the left side of her back and radiates into the hip and lower extremity to the mid calf. No numbness or tingling. No bowel or bladder incontinence. Taking ibuprofen at home without much relief. Denies other worsening or alleviating factors.       Review of patient's allergies indicates:  No Known Allergies  Past Medical History:   Diagnosis Date    Bronchitis     Hypertension     Migraine headache     Migraines      Past Surgical History:   Procedure Laterality Date     SECTION      GALLBLADDER SURGERY  2017    TUBAL LIGATION       Family History   Problem Relation Age of Onset    Asthma Mother     Heart disease Father      Social History     Tobacco Use    Smoking status: Former     Current packs/day: 0.00     Types: Cigarettes     Start date: 3/1/1998     Quit date: 3/1/2008     Years since quitting: 15.7    Smokeless tobacco: Never   Substance Use Topics    Alcohol use: Yes     Alcohol/week: 2.0 standard drinks of alcohol     Types: 2 Cans of beer per week     Comment: ocassionally    Drug use: No     Review of Systems   Musculoskeletal:  Positive for back pain.       Physical Exam     Initial Vitals [23 1446]   BP Pulse Resp Temp SpO2   (!) 143/68 95 18 98.5 °F (36.9 °C) 98 %      MAP       --         Physical Exam    Vitals reviewed.  Constitutional: She appears well-developed and well-nourished. She is not diaphoretic. No distress.   HENT:   Head: Normocephalic and atraumatic.   Mouth/Throat: " Oropharynx is clear and moist.   Eyes: Conjunctivae and EOM are normal. Pupils are equal, round, and reactive to light.   Neck: Neck supple.   Normal range of motion.  Cardiovascular:  Normal rate.           Pulmonary/Chest: No respiratory distress.   Abdominal: She exhibits no distension.   Musculoskeletal:         General: Normal range of motion.      Cervical back: Normal range of motion and neck supple.      Comments: Ecchymosis noted to left lower extremity around the buttock, thigh, and calf.  Compartments are soft.  Sensation to light touch intact.  Strength 5/5 bilateral lower extremities.  Full range of motion of all joints.  She does have pain with internal and external rotation of the hip.  She is no midline tenderness of the cervical, thoracic, lumbar spine.  Tenderness palpation along the left SI joint.     Neurological: She is alert and oriented to person, place, and time. She has normal strength. No cranial nerve deficit or sensory deficit. GCS score is 15. GCS eye subscore is 4. GCS verbal subscore is 5. GCS motor subscore is 6.   Skin: Skin is warm and dry. Capillary refill takes less than 2 seconds.   Psychiatric: She has a normal mood and affect. Her behavior is normal. Judgment and thought content normal.         ED Course   Procedures  Labs Reviewed - No data to display         Imaging Results              X-Ray Hip 2 or 3 views Left (with Pelvis when performed) (Final result)  Result time 12/05/23 17:42:07      Final result by Miquel Herring MD (12/05/23 17:42:07)                   Impression:      1. No acute displaced fracture or dislocation of the left hip.      Electronically signed by: Miquel Herring MD  Date:    12/05/2023  Time:    17:42               Narrative:    EXAMINATION:  XR HIP WITH PELVIS WHEN PERFORMED, 2 OR 3 VIEWS LEFT    CLINICAL HISTORY:  Unspecified fall, initial encounter    TECHNIQUE:  AP view of the pelvis and frog leg lateral view of the left hip were  performed.    COMPARISON:  None    FINDINGS:  Two views left hip.    The bilateral sacroiliac joints are intact.  The pubic symphysis is intact.  The bilateral femoroacetabular joints are intact.                                       Medications   acetaminophen tablet 1,000 mg (1,000 mg Oral Given 12/5/23 1705)   naproxen tablet 500 mg (500 mg Oral Given 12/5/23 1705)   methocarbamoL tablet 750 mg (750 mg Oral Given 12/5/23 1705)     Medical Decision Making  Emergent evaluation of a 43 y.o. female presenting to the emergency department complaining of left-sided back pain that radiates the left mid calf.  Symptoms began after she fell 5 days ago. Patient is afebrile, hemodynamically stable, and non toxic appearing.     Imaging ordered in tele triage.  I will give analgesia.    Differential diagnosis includes but isn't limited to lumbar radiculopathy, SI joint dysfunction, fracture, dislocation.    X-ray of the left hip without acute displaced fracture or dislocation.  The patient is able to ambulate.  I do not feel that further imaging is warranted at this time.  I suspect her pain is likely secondary to lumbar radiculopathy.  Will treat with multimodal regimen.  Recommend outpatient follow-up.  Return precautions.  All questions answered.  The patient was instructed to follow up with a primary care provider or to return to the emergency department for worsening symptoms. The treatment plan was discussed with the patient who demonstrated understanding and comfort with plan.     Risk  OTC drugs.  Prescription drug management.                                      Clinical Impression:  Final diagnoses:  [W19.XXXA] Fall (Primary)  [M54.16] Lumbar radiculopathy  [S80.12XA] Contusion of multiple sites of left lower extremity, initial encounter          ED Disposition Condition    Discharge Stable          ED Prescriptions       Medication Sig Dispense Start Date End Date Auth. Provider    naproxen (NAPROSYN) 500 MG tablet  Take 1 tablet (500 mg total) by mouth 2 (two) times daily as needed (pain). 10 tablet 12/5/2023 -- Lizzie Mccallum PA-C    methocarbamoL (ROBAXIN) 500 MG Tab Take 1 tablet (500 mg total) by mouth 3 (three) times daily as needed (pain). 15 tablet 12/5/2023 -- Lizzie Mccallum PA-C    methylPREDNISolone (MEDROL DOSEPACK) 4 mg tablet use as directed 21 each 12/5/2023 -- Lzizie Mccallum PA-C          Follow-up Information       Follow up With Specialties Details Why Contact Info    [facility] - Emergency Dept Emergency Medicine  If symptoms worsen [id number]  [facility] [address] [phone]    Bibi iRch MD Family Medicine Schedule an appointment as soon as possible for a visit in 1 week  [address] [facility]  [place] [address]  [phone]               Lizzie Mccallum PA-C  12/08/23 0314

## 2023-12-05 NOTE — ED NOTES
Patient identifiers verified and correct for  MS Lopez  C/C:  Left lower back pain radiates down leg SEE NN  APPEARANCE: awake and alert in NAD. PAIN  10/10  SKIN: warm, dry  purple bruising noted to left outer thigh  MUSCULOSKELETAL: Patient moving all extremities spontaneously, no obvious swelling or deformities noted. Ambulates independently.  RESPIRATORY: Denies shortness of breath.Respirations unlabored.   CARDIAC: Denies CP, 2+ distal pulses; no peripheral edema  ABDOMEN: S/ND/NT, Denies nausea  : voids spontaneously, denies difficulty  Neurologic: AAO x 4; follows commands equal strength in all extremities; denies numbness/tingling. Denies dizziness Denies new weakness, reports pain,hip/back/leg

## 2023-12-06 NOTE — DISCHARGE INSTRUCTIONS
Take Tylenol and naproxen as needed for pain.  Do not take naproxen with other anti-inflammatories such as Advil, Aleve, ibuprofen.  Robaxin as a muscle relaxant and may make you drowsy.  Do not drink alcohol or drive while taking the medication.  Take Medrol Dosepak as prescribed.  Follow up with your primary doctor or return to the emergency department sooner for any new or worsening symptoms.

## 2024-02-09 ENCOUNTER — HOSPITAL ENCOUNTER (EMERGENCY)
Facility: HOSPITAL | Age: 44
Discharge: HOME | End: 2024-02-09
Attending: EMERGENCY MEDICINE
Payer: MEDICAID

## 2024-02-09 VITALS
DIASTOLIC BLOOD PRESSURE: 101 MMHG | TEMPERATURE: 98 F | WEIGHT: 208 LBS | HEART RATE: 75 BPM | RESPIRATION RATE: 16 BRPM | HEIGHT: 70 IN | BODY MASS INDEX: 29.78 KG/M2 | SYSTOLIC BLOOD PRESSURE: 166 MMHG | OXYGEN SATURATION: 99 %

## 2024-02-09 DIAGNOSIS — S50.12XA CONTUSION OF LEFT FOREARM, INITIAL ENCOUNTER: Primary | ICD-10-CM

## 2024-02-09 DIAGNOSIS — R52 PAIN: ICD-10-CM

## 2024-02-09 PROCEDURE — 99283 EMERGENCY DEPT VISIT LOW MDM: CPT | Mod: 25

## 2024-02-09 PROCEDURE — 25000003 PHARM REV CODE 250: Performed by: EMERGENCY MEDICINE

## 2024-02-09 RX ORDER — IBUPROFEN 600 MG/1
600 TABLET ORAL
Status: COMPLETED | OUTPATIENT
Start: 2024-02-09 | End: 2024-02-09

## 2024-02-09 RX ORDER — ACETAMINOPHEN 500 MG
1000 TABLET ORAL
Status: COMPLETED | OUTPATIENT
Start: 2024-02-09 | End: 2024-02-09

## 2024-02-09 RX ADMIN — IBUPROFEN 600 MG: 600 TABLET, FILM COATED ORAL at 11:02

## 2024-02-09 RX ADMIN — ACETAMINOPHEN 1000 MG: 500 TABLET ORAL at 11:02

## 2024-02-09 NOTE — Clinical Note
"Shady Whiting" John was seen and treated in our emergency department on 2/9/2024.  She may return to work on 02/12/2024.       If you have any questions or concerns, please don't hesitate to call.           "
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
## 2024-02-09 NOTE — ED NOTES
Patient identifiers verified and correct for Shady Lopez   LOC: The patient is aao x 3  APPEARANCE: Patient appears comfortable and in no acute distress, patient appears unkept   SKIN: The skin is warm and dry, color consistent with ethnicity, patient has normal skin turgor and moist mucus membranes, abrasion noted to L forearm   MUSCULOSKELETAL: Patient moving all extremities spontaneously, pt c/o left arm pain and left hip pain  RESPIRATORY: Airway is open and patent, respirations are spontaneous, patient has a normal effort and rate, no accessory muscle use noted, O2 sats noted at 97% on room air.  CARDIAC: Patient has a normal rate, no edema noted, capillary refill < 3 seconds.   GASTRO: Soft and non tender to palpation, no distention noted. Pt states bowel movements have been regular.  : Pt denies any pain or frequency with urination.  NEURO: Pt opens eyes spontaneously, follows commands, facial expression symmetrical, bilateral hand grasp equal and even, purposeful motor response noted, pt c/o left hand numbness/tingling
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
## 2024-02-17 ENCOUNTER — HOSPITAL ENCOUNTER (EMERGENCY)
Facility: HOSPITAL | Age: 44
Discharge: HOME | End: 2024-02-17
Attending: EMERGENCY MEDICINE
Payer: MEDICAID

## 2024-02-17 VITALS
HEART RATE: 83 BPM | RESPIRATION RATE: 16 BRPM | TEMPERATURE: 99 F | HEIGHT: 70 IN | DIASTOLIC BLOOD PRESSURE: 73 MMHG | WEIGHT: 205 LBS | SYSTOLIC BLOOD PRESSURE: 165 MMHG | BODY MASS INDEX: 29.35 KG/M2 | OXYGEN SATURATION: 96 %

## 2024-02-17 DIAGNOSIS — G43.909 MIGRAINE WITHOUT STATUS MIGRAINOSUS, NOT INTRACTABLE, UNSPECIFIED MIGRAINE TYPE: Primary | ICD-10-CM

## 2024-02-17 PROCEDURE — 96374 THER/PROPH/DIAG INJ IV PUSH: CPT

## 2024-02-17 PROCEDURE — 63600175 PHARM REV CODE 636 W HCPCS

## 2024-02-17 PROCEDURE — 96361 HYDRATE IV INFUSION ADD-ON: CPT

## 2024-02-17 PROCEDURE — 99284 EMERGENCY DEPT VISIT MOD MDM: CPT

## 2024-02-17 RX ORDER — PROCHLORPERAZINE EDISYLATE 5 MG/ML
10 INJECTION INTRAMUSCULAR; INTRAVENOUS
Status: DISCONTINUED | OUTPATIENT
Start: 2024-02-17 | End: 2024-02-17

## 2024-02-17 RX ORDER — RIZATRIPTAN BENZOATE 5 MG/1
5 TABLET ORAL
COMMUNITY

## 2024-02-17 RX ORDER — PROCHLORPERAZINE EDISYLATE 5 MG/ML
10 INJECTION INTRAMUSCULAR; INTRAVENOUS
Status: COMPLETED | OUTPATIENT
Start: 2024-02-17 | End: 2024-02-17

## 2024-02-17 RX ORDER — GABAPENTIN 800 MG/1
800 TABLET ORAL 3 TIMES DAILY
COMMUNITY

## 2024-02-17 RX ORDER — KETOROLAC TROMETHAMINE 30 MG/ML
15 INJECTION, SOLUTION INTRAMUSCULAR; INTRAVENOUS
Status: CANCELLED | OUTPATIENT
Start: 2024-02-17 | End: 2024-02-17

## 2024-02-17 RX ADMIN — SODIUM CHLORIDE, POTASSIUM CHLORIDE, SODIUM LACTATE AND CALCIUM CHLORIDE 1000 ML: 600; 310; 30; 20 INJECTION, SOLUTION INTRAVENOUS at 06:02

## 2024-02-17 RX ADMIN — PROCHLORPERAZINE EDISYLATE 10 MG: 5 INJECTION INTRAMUSCULAR; INTRAVENOUS at 06:02

## 2024-02-17 NOTE — ED TRIAGE NOTES
Past Medical History:   Diagnosis Date    Bronchitis     Hypertension     Migraine headache     Migraines       Pt with hx of migraines, insomnia, diverticulitis presents with c/o migraine started around 2030 last night. She ran out of her maxalt. She reports associated light sensitivity, notes migraine similar to previous. Denies any speech, vision changes, weakness. Reports dealing with migraines since she was 9 yo.

## 2024-02-17 NOTE — ED PROVIDER NOTES
Encounter Date: 2024       History     Chief Complaint   Patient presents with    Migraine     Notes hx of the same, and feels similar, started around 2030 last night     HPI  Shady Lopez is a 43 y.o. female, past medical history migraine, presenting with complaints of migraine since 8pm last night.  She reports photophobia and phonophobia.  She denies any changes in her vision, chest pain, shortness of breath.  She reports a history of migraines it felt similar in the past.  She reports having her preventative migraine medication changed recently and states that she has had poor control of her symptoms since that time.  She denies recent illness.  She reports chronic insomnia with poor sleep.  She denies any recent changes to her p.o. intake.  Review of patient's allergies indicates:  No Known Allergies  Past Medical History:   Diagnosis Date    Bronchitis     Diverticulitis     Hypertension     Insomnia     Migraine headache     Migraines      Past Surgical History:   Procedure Laterality Date     SECTION      GALLBLADDER SURGERY  2017    TUBAL LIGATION       Family History   Problem Relation Age of Onset    Asthma Mother     Heart disease Father      Social History     Tobacco Use    Smoking status: Former     Current packs/day: 0.00     Types: Cigarettes     Start date: 3/1/1998     Quit date: 3/1/2008     Years since quitting: 15.9    Smokeless tobacco: Never   Substance Use Topics    Alcohol use: Yes     Alcohol/week: 2.0 standard drinks of alcohol     Types: 2 Cans of beer per week     Comment: ocassionally    Drug use: No     Review of Systems    Physical Exam     Initial Vitals [24 0348]   BP Pulse Resp Temp SpO2   (!) 160/86 100 18 97.5 °F (36.4 °C) 97 %      MAP       --         Physical Exam    Nursing note and vitals reviewed.  Constitutional: She appears well-developed and well-nourished. She is not diaphoretic. She is active and cooperative.  Non-toxic appearance. She does  not appear ill. No distress.   HENT:   Head: Normocephalic.   Eyes: Conjunctivae and EOM are normal. No scleral icterus.   Cardiovascular:  Normal rate, regular rhythm and intact distal pulses.  No extrasystoles are present.    Exam reveals no gallop, no S3, no S4, no distant heart sounds and no friction rub.       No murmur heard.  Pulmonary/Chest: Breath sounds normal. No respiratory distress. She has no wheezes. She has no rhonchi. She has no rales.   Abdominal: Abdomen is soft. She exhibits no distension. There is no abdominal tenderness. There is no rebound and no guarding.     Neurological: She is alert. She is not disoriented. GCS score is 15. GCS eye subscore is 4. GCS verbal subscore is 5. GCS motor subscore is 6.   Skin: Skin is warm and dry.         ED Course   Procedures  Labs Reviewed - No data to display         Imaging Results    None          Medications   lactated ringers bolus 1,000 mL (0 mLs Intravenous Stopped 2/17/24 0646)   prochlorperazine injection Soln 10 mg (10 mg Intravenous Given 2/17/24 0605)     Medical Decision Making  Shady Lopez  is a 43 y.o. female, presenting with complaints of migraine headache, history of present illness obtained from patient as seen above. At time of initial exam patient resting in a chair covering her eyes and reports sensitivity to light, able to provide adequate history of present illness and tolerated physical exam well. Patient found to be mild distress, stable. Exam notable as above.     DDX includes but is not limited to:  Migraine headache, intracranial bleed, viral syndrome, dehydration.  Patient denies any changes to her recent p.o. intake in his endorsing good fluid hydration; unlikely to be dehydration.  She denies any systemic symptoms including cough, nasal congestion, fevers, chills; unlikely to be viral syndrome.  Patient denies any changes to her vision or neurosensory changes, she was not have recent head trauma; unlikely to be intracranial  bleed.  Patient reports significant past medical history with chronic migraines that have been poorly controlled since a recent change in her medications.  She reports poor sleep hygiene which is likely contributing to her migraine symptoms.  Patient reports previous success with treatment with Compazine in the past.  Patient provided IV Compazine and 1 L IV fluid with significant improvement in her previous headache symptoms.  Patient encouraged to follow-up with primary care for ongoing management of chronic migraine symptoms.    Consideration was given for admission, but shared decision making was utilized with the patient.  Decision made the patient was stable for outpatient management.     Data Reviewed/Counseling: I have reviewed the patient's vital signs, nursing notes, and other relevant tests/information. I had a detailed discussion regarding the historical points, exam findings, and any diagnostic results supporting the discharge diagnosis. Any incidental findings were discussed with the patient. I also discussed the need for outpatient follow-up and the need to return to the ED if symptoms worsen or if there are any questions or concerns that arise at home. Strict return and follow-up precautions have been given by me personally to the patient/family/caregiver(s).  Disposition: Discharged     Amount and/or Complexity of Data Reviewed  Labs: ordered.    Risk  Prescription drug management.              Attending Attestation:   Physician Attestation Statement for Resident:  As the supervising MD   Physician Attestation Statement: I have personally seen and examined this patient.   I agree with the above history.  -: Patient notes migraine is consistent with previous migraines.  She requests Compazine.  No fever meningeal signs to suggest meningitis.  Will proceed with migraine cocktail as requested and reassess.   As the supervising MD I agree with the above PE.     As the supervising MD I agree with the  above treatment, course, plan, and disposition.                                           Clinical Impression:  Final diagnoses:  [G43.909] Migraine without status migrainosus, not intractable, unspecified migraine type (Primary)          ED Disposition Condition    Discharge Stable          ED Prescriptions    None       Follow-up Information       Follow up With Specialties Details Why Contact Info    Your Primary Care Provider  Schedule an appointment as soon as possible for a visit in 1 week  Follow up with your PCP as soon as possible. If you do not have a PCP, please follow up with [facility], you may contact them to schedule an appointment .    [facility] Emergency Medicine Go to  As needed, If symptoms worsen [address]  [facility] [address]  [phone]             Erica Moseley MD  Resident  02/17/24 0869

## 2024-02-17 NOTE — DISCHARGE INSTRUCTIONS
Diagnosis: Migraines  Tests today showed:   Labs Reviewed   HIV 1 / 2 ANTIBODY   HEPATITIS C ANTIBODY     No orders to display     Treatments you had today:   Medications   lactated ringers bolus 1,000 mL (1,000 mLs Intravenous New Bag 2/17/24 0605)   prochlorperazine injection Soln 10 mg (10 mg Intravenous Given 2/17/24 0605)     Follow-Up Plan:  - Follow-up with primary care doctor within 3 - 5 days  - Additional testing and/or evaluation as directed by your primary doctor    Return to the Emergency Department for symptoms including but not limited to: worsening symptoms, shortness of breath or chest pain, vomiting with inability to hold down fluids, fevers greater than 100.4°F, passing out/fainting/unconsciousness, or other concerning symptoms.    We would like to thank you for coming today and our hope is that we served you and your family well during your stay

## 2024-02-17 NOTE — Clinical Note
"Shady Whiting" John was seen and treated in our emergency department on 2/17/2024.  She may return to work on 02/18/2024.       If you have any questions or concerns, please don't hesitate to call.      Erica Moseley MD"

## (undated) DEVICE — SYR 10CC LUER LOCK

## (undated) DEVICE — CANISTER SUCTION 2 LTR

## (undated) DEVICE — SOL NS 1000CC

## (undated) DEVICE — TROCAR ENDOPATH XCEL 5X100MM

## (undated) DEVICE — SUT MONOCRYL 4-0 PS-2

## (undated) DEVICE — POSITIONER HEAD DONUT 9IN FOAM

## (undated) DEVICE — SEE MEDLINE ITEM 146372

## (undated) DEVICE — SEAL SCOPE WARMER 20/BX

## (undated) DEVICE — SEE L#120831

## (undated) DEVICE — TROCAR ENDOPATH XCEL 11MM 10CM

## (undated) DEVICE — SEE MEDLINE ITEM 152622

## (undated) DEVICE — APPLIER CLIP ENDO LIGAMAX 5MM

## (undated) DEVICE — CHLORAPREP W TINT 26ML APPL

## (undated) DEVICE — BLADE SURG CARBON STEEL SZ11

## (undated) DEVICE — IRRIGATOR ENDOSCOPY DISP.

## (undated) DEVICE — SCISSOR CURVED ENDOPATH 5MM

## (undated) DEVICE — NDL HYPO REG 25G X 1 1/2

## (undated) DEVICE — SUT 0 VICRYL / UR6 (J603)

## (undated) DEVICE — ELECTRODE REM PLYHSV RETURN 9

## (undated) DEVICE — Device

## (undated) DEVICE — NDL INSUF ULTRA VERESS 120MM

## (undated) DEVICE — SEE MEDLINE ITEM 157117

## (undated) DEVICE — GLOVE SURG BIOGEL LATEX SZ 7.5

## (undated) DEVICE — PACK ENDOSCOPY GENERAL

## (undated) DEVICE — TUBING INSUFFLATION 10

## (undated) DEVICE — ADHESIVE DERMABOND ADVANCED

## (undated) DEVICE — BLANKET UPPER BODY 78.7X29.9IN

## (undated) DEVICE — KIT ANTIFOG